# Patient Record
Sex: MALE | Race: WHITE | NOT HISPANIC OR LATINO | URBAN - METROPOLITAN AREA
[De-identification: names, ages, dates, MRNs, and addresses within clinical notes are randomized per-mention and may not be internally consistent; named-entity substitution may affect disease eponyms.]

---

## 2020-08-17 ENCOUNTER — INPATIENT (INPATIENT)
Facility: HOSPITAL | Age: 55
LOS: 0 days | Discharge: ANOTHER HOSPITAL | End: 2020-08-17
Attending: INTERNAL MEDICINE | Admitting: INTERNAL MEDICINE
Payer: COMMERCIAL

## 2020-08-17 ENCOUNTER — INPATIENT (INPATIENT)
Facility: HOSPITAL | Age: 55
LOS: 7 days | Discharge: HOME | DRG: 236 | End: 2020-08-25
Attending: THORACIC SURGERY (CARDIOTHORACIC VASCULAR SURGERY) | Admitting: THORACIC SURGERY (CARDIOTHORACIC VASCULAR SURGERY)
Payer: COMMERCIAL

## 2020-08-17 VITALS
SYSTOLIC BLOOD PRESSURE: 147 MMHG | HEART RATE: 91 BPM | DIASTOLIC BLOOD PRESSURE: 97 MMHG | OXYGEN SATURATION: 97 % | RESPIRATION RATE: 17 BRPM | TEMPERATURE: 98 F

## 2020-08-17 DIAGNOSIS — I25.118 ATHEROSCLEROTIC HEART DISEASE OF NATIVE CORONARY ARTERY WITH OTHER FORMS OF ANGINA PECTORIS: ICD-10-CM

## 2020-08-17 DIAGNOSIS — Z29.9 ENCOUNTER FOR PROPHYLACTIC MEASURES, UNSPECIFIED: ICD-10-CM

## 2020-08-17 DIAGNOSIS — E11.9 TYPE 2 DIABETES MELLITUS WITHOUT COMPLICATIONS: ICD-10-CM

## 2020-08-17 DIAGNOSIS — E78.5 HYPERLIPIDEMIA, UNSPECIFIED: ICD-10-CM

## 2020-08-17 DIAGNOSIS — I25.10 ATHEROSCLEROTIC HEART DISEASE OF NATIVE CORONARY ARTERY WITHOUT ANGINA PECTORIS: ICD-10-CM

## 2020-08-17 LAB
A1C WITH ESTIMATED AVERAGE GLUCOSE RESULT: 6.9 % — HIGH (ref 4–5.6)
ALBUMIN SERPL ELPH-MCNC: 4.2 G/DL — SIGNIFICANT CHANGE UP (ref 3.3–5.2)
ALP SERPL-CCNC: 61 U/L — SIGNIFICANT CHANGE UP (ref 40–120)
ALT FLD-CCNC: 36 U/L — SIGNIFICANT CHANGE UP
ANION GAP SERPL CALC-SCNC: 15 MMOL/L — SIGNIFICANT CHANGE UP (ref 5–17)
APTT BLD: 36.7 SEC — HIGH (ref 27.5–35.5)
AST SERPL-CCNC: 34 U/L — SIGNIFICANT CHANGE UP
BASOPHILS # BLD AUTO: 0.05 K/UL — SIGNIFICANT CHANGE UP (ref 0–0.2)
BASOPHILS NFR BLD AUTO: 0.5 % — SIGNIFICANT CHANGE UP (ref 0–2)
BILIRUB SERPL-MCNC: 0.4 MG/DL — SIGNIFICANT CHANGE UP (ref 0.4–2)
BLD GP AB SCN SERPL QL: SIGNIFICANT CHANGE UP
BUN SERPL-MCNC: 15 MG/DL — SIGNIFICANT CHANGE UP (ref 8–20)
CALCIUM SERPL-MCNC: 10 MG/DL — SIGNIFICANT CHANGE UP (ref 8.6–10.2)
CHLORIDE SERPL-SCNC: 104 MMOL/L — SIGNIFICANT CHANGE UP (ref 98–107)
CO2 SERPL-SCNC: 22 MMOL/L — SIGNIFICANT CHANGE UP (ref 22–29)
CREAT SERPL-MCNC: 1.05 MG/DL — SIGNIFICANT CHANGE UP (ref 0.5–1.3)
EOSINOPHIL # BLD AUTO: 0.36 K/UL — SIGNIFICANT CHANGE UP (ref 0–0.5)
EOSINOPHIL NFR BLD AUTO: 3.8 % — SIGNIFICANT CHANGE UP (ref 0–6)
ESTIMATED AVERAGE GLUCOSE: 151 MG/DL — HIGH (ref 68–114)
GLUCOSE BLDC GLUCOMTR-MCNC: 158 MG/DL — HIGH (ref 70–99)
GLUCOSE SERPL-MCNC: 164 MG/DL — HIGH (ref 70–99)
HCT VFR BLD CALC: 39.1 % — SIGNIFICANT CHANGE UP (ref 39–50)
HGB BLD-MCNC: 13.4 G/DL — SIGNIFICANT CHANGE UP (ref 13–17)
IMM GRANULOCYTES NFR BLD AUTO: 0.2 % — SIGNIFICANT CHANGE UP (ref 0–1.5)
INR BLD: 1.21 RATIO — HIGH (ref 0.88–1.16)
LYMPHOCYTES # BLD AUTO: 2.17 K/UL — SIGNIFICANT CHANGE UP (ref 1–3.3)
LYMPHOCYTES # BLD AUTO: 22.6 % — SIGNIFICANT CHANGE UP (ref 13–44)
MAGNESIUM SERPL-MCNC: 2 MG/DL — SIGNIFICANT CHANGE UP (ref 1.6–2.6)
MCHC RBC-ENTMCNC: 28.2 PG — SIGNIFICANT CHANGE UP (ref 27–34)
MCHC RBC-ENTMCNC: 34.3 GM/DL — SIGNIFICANT CHANGE UP (ref 32–36)
MCV RBC AUTO: 82.1 FL — SIGNIFICANT CHANGE UP (ref 80–100)
MONOCYTES # BLD AUTO: 0.63 K/UL — SIGNIFICANT CHANGE UP (ref 0–0.9)
MONOCYTES NFR BLD AUTO: 6.6 % — SIGNIFICANT CHANGE UP (ref 2–14)
NEUTROPHILS # BLD AUTO: 6.37 K/UL — SIGNIFICANT CHANGE UP (ref 1.8–7.4)
NEUTROPHILS NFR BLD AUTO: 66.3 % — SIGNIFICANT CHANGE UP (ref 43–77)
NT-PROBNP SERPL-SCNC: 569 PG/ML — HIGH (ref 0–300)
PLATELET # BLD AUTO: 274 K/UL — SIGNIFICANT CHANGE UP (ref 150–400)
POTASSIUM SERPL-MCNC: 3.7 MMOL/L — SIGNIFICANT CHANGE UP (ref 3.5–5.3)
POTASSIUM SERPL-SCNC: 3.7 MMOL/L — SIGNIFICANT CHANGE UP (ref 3.5–5.3)
PREALB SERPL-MCNC: 21 MG/DL — SIGNIFICANT CHANGE UP (ref 18–38)
PROT SERPL-MCNC: 6.7 G/DL — SIGNIFICANT CHANGE UP (ref 6.6–8.7)
PROTHROM AB SERPL-ACNC: 13.9 SEC — HIGH (ref 10.6–13.6)
RBC # BLD: 4.76 M/UL — SIGNIFICANT CHANGE UP (ref 4.2–5.8)
RBC # FLD: 13.2 % — SIGNIFICANT CHANGE UP (ref 10.3–14.5)
SODIUM SERPL-SCNC: 141 MMOL/L — SIGNIFICANT CHANGE UP (ref 135–145)
TSH SERPL-MCNC: 4.58 UIU/ML — HIGH (ref 0.27–4.2)
WBC # BLD: 9.6 K/UL — SIGNIFICANT CHANGE UP (ref 3.8–10.5)
WBC # FLD AUTO: 9.6 K/UL — SIGNIFICANT CHANGE UP (ref 3.8–10.5)

## 2020-08-17 PROCEDURE — 93458 L HRT ARTERY/VENTRICLE ANGIO: CPT | Mod: 26

## 2020-08-17 PROCEDURE — 93010 ELECTROCARDIOGRAM REPORT: CPT

## 2020-08-17 PROCEDURE — 99285 EMERGENCY DEPT VISIT HI MDM: CPT

## 2020-08-17 PROCEDURE — 71045 X-RAY EXAM CHEST 1 VIEW: CPT | Mod: 26

## 2020-08-17 PROCEDURE — 99223 1ST HOSP IP/OBS HIGH 75: CPT | Mod: 25

## 2020-08-17 PROCEDURE — 71045 X-RAY EXAM CHEST 1 VIEW: CPT | Mod: 26,77

## 2020-08-17 PROCEDURE — 93306 TTE W/DOPPLER COMPLETE: CPT | Mod: 26,59

## 2020-08-17 PROCEDURE — 99222 1ST HOSP IP/OBS MODERATE 55: CPT

## 2020-08-17 RX ORDER — GLUCAGON INJECTION, SOLUTION 0.5 MG/.1ML
1 INJECTION, SOLUTION SUBCUTANEOUS ONCE
Refills: 0 | Status: DISCONTINUED | OUTPATIENT
Start: 2020-08-17 | End: 2020-08-20

## 2020-08-17 RX ORDER — DEXTROSE 50 % IN WATER 50 %
25 SYRINGE (ML) INTRAVENOUS ONCE
Refills: 0 | Status: DISCONTINUED | OUTPATIENT
Start: 2020-08-17 | End: 2020-08-20

## 2020-08-17 RX ORDER — DEXTROSE 50 % IN WATER 50 %
12.5 SYRINGE (ML) INTRAVENOUS ONCE
Refills: 0 | Status: DISCONTINUED | OUTPATIENT
Start: 2020-08-17 | End: 2020-08-20

## 2020-08-17 RX ORDER — POTASSIUM CHLORIDE 20 MEQ
40 PACKET (EA) ORAL ONCE
Refills: 0 | Status: COMPLETED | OUTPATIENT
Start: 2020-08-17 | End: 2020-08-17

## 2020-08-17 RX ORDER — METOPROLOL TARTRATE 50 MG
25 TABLET ORAL
Refills: 0 | Status: DISCONTINUED | OUTPATIENT
Start: 2020-08-17 | End: 2020-08-20

## 2020-08-17 RX ORDER — CHLORHEXIDINE GLUCONATE 213 G/1000ML
15 SOLUTION TOPICAL
Refills: 0 | Status: DISCONTINUED | OUTPATIENT
Start: 2020-08-17 | End: 2020-08-20

## 2020-08-17 RX ORDER — SODIUM CHLORIDE 9 MG/ML
1000 INJECTION, SOLUTION INTRAVENOUS
Refills: 0 | Status: DISCONTINUED | OUTPATIENT
Start: 2020-08-17 | End: 2020-08-20

## 2020-08-17 RX ORDER — ATORVASTATIN CALCIUM 80 MG/1
40 TABLET, FILM COATED ORAL AT BEDTIME
Refills: 0 | Status: DISCONTINUED | OUTPATIENT
Start: 2020-08-17 | End: 2020-08-20

## 2020-08-17 RX ORDER — CHLORHEXIDINE GLUCONATE 213 G/1000ML
1 SOLUTION TOPICAL
Refills: 0 | Status: DISCONTINUED | OUTPATIENT
Start: 2020-08-17 | End: 2020-08-20

## 2020-08-17 RX ORDER — ASPIRIN/CALCIUM CARB/MAGNESIUM 324 MG
81 TABLET ORAL DAILY
Refills: 0 | Status: DISCONTINUED | OUTPATIENT
Start: 2020-08-17 | End: 2020-08-18

## 2020-08-17 RX ORDER — METOPROLOL TARTRATE 50 MG
25 TABLET ORAL
Refills: 0 | Status: DISCONTINUED | OUTPATIENT
Start: 2020-08-17 | End: 2020-08-17

## 2020-08-17 RX ORDER — SODIUM CHLORIDE 9 MG/ML
3 INJECTION INTRAMUSCULAR; INTRAVENOUS; SUBCUTANEOUS EVERY 8 HOURS
Refills: 0 | Status: DISCONTINUED | OUTPATIENT
Start: 2020-08-17 | End: 2020-08-20

## 2020-08-17 RX ORDER — DEXTROSE 50 % IN WATER 50 %
15 SYRINGE (ML) INTRAVENOUS ONCE
Refills: 0 | Status: DISCONTINUED | OUTPATIENT
Start: 2020-08-17 | End: 2020-08-20

## 2020-08-17 RX ORDER — INSULIN LISPRO 100/ML
VIAL (ML) SUBCUTANEOUS
Refills: 0 | Status: DISCONTINUED | OUTPATIENT
Start: 2020-08-17 | End: 2020-08-20

## 2020-08-17 RX ADMIN — Medication 40 MILLIEQUIVALENT(S): at 22:01

## 2020-08-17 RX ADMIN — ATORVASTATIN CALCIUM 40 MILLIGRAM(S): 80 TABLET, FILM COATED ORAL at 21:14

## 2020-08-17 RX ADMIN — SODIUM CHLORIDE 3 MILLILITER(S): 9 INJECTION INTRAMUSCULAR; INTRAVENOUS; SUBCUTANEOUS at 21:14

## 2020-08-17 RX ADMIN — Medication 25 MILLIGRAM(S): at 22:01

## 2020-08-17 NOTE — H&P ADULT - PROBLEM SELECTOR PLAN 2
Consist. carb diet  Endo consult in AM  MISHEL  Will consider pre meal and Lantus in AM  Diabetic education

## 2020-08-17 NOTE — H&P ADULT - PROBLEM SELECTOR PLAN 4
SCD for DVT prophylaxis  Protonix for PUD prophylaxis  Further plan needs to discuss with CTS team in AM rounds

## 2020-08-17 NOTE — H&P ADULT - NSHPSOCIALHISTORY_GEN_ALL_CORE
Marital status:   Lives with: Wife  Occupation:    Tobacco use: Never smoker  Alcohol use: Denies  Illicit drug use: Denies    Code status: Full code

## 2020-08-17 NOTE — H&P ADULT - NSHPPHYSICALEXAM_GEN_ALL_CORE
Neuro: A+O x 3, non-focal, speech clear and intact  HEENT: PERRL, EOMI, oral mucosa pink and moist  Neck: supple, no JVD  CV: regular rate, regular rhythm, +S1S2, no murmurs or rub  Pulm/chest: lung sounds CTA and equal bilaterally, no accessory muscle use noted  Abd: soft, NT, ND, +BS  Ext: MUNOZ x 4, no C/C/E  Skin: warm, well perfused, no rashes

## 2020-08-17 NOTE — H&P ADULT - ASSESSMENT
54 year old Male with PMH of Type II DM and [place] [facility] [place] which revealed Triple vessel diease. 54 year old Male with PMH of Type II DM and HLD transferred from [facility] [place] after Cardiac cath which revealed Triple vessel diease. Prox LAD 99%, Mid LAD 90%, Dist. LAD 85%. Mid Cx 85%, OM1 85%, Dist RCA 90% stenosis.

## 2020-08-17 NOTE — H&P ADULT - HISTORY OF PRESENT ILLNESS
54 year old Male with PMH of DM type II, HLD transferred from [facility] s/p cardiac cath which reveals Triple vessel disease. Patient presented to [facility] [place] with sudden onset of chest pressure and tightness radiating to neck and left arm, onset during rest, improved after Aspirin 81 mg x4 by EMS. As per patient he noticed to have mild to moderate  activity intolerance over 1 year, worsened in past week to a level that he has to stop between minimal activities of daily living. Patient denies fever, chills, shortness of breath, dizziness, headache, abdominal pain, N/V/D. Patient denies lower extremity edema/pain, recent long distance travel or sick contact. Patient denies exposure to COVID 19 but his wife recently positive for COVID antibodies. Patient COVID PCR from [facility] tested negative on 8/17/2020.

## 2020-08-17 NOTE — H&P ADULT - PROBLEM SELECTOR PLAN 1
Admit under Dr. Plasencia  Pre op work up started  ECHO, Carotid, PFT Pending  COVID antibody/PCR pending (PCR negative from [facility] 8/17)  Lopressor 25 mg BID  Lipitor 40 mg   Aspirin 81 mg   DASH/ Const. Carb diet  Insulin coverage as per sliding scale, (AIC 6.9), will consider Lantus, pre- meal in AM  Endo consult in AM Admit under Dr. Plasencia  Pre op work up started  ECHO, Carotid, PFT Pending  COVID antibody/PCR pending (PCR negative from [facility] 8/17)  Lopressor 25 mg BID  Lipitor 40 mg   DASH/ Const. Carb diet  Insulin coverage as per sliding scale, (AIC 6.9), will consider Lantus, pre- meal in AM  Endo consult in AM

## 2020-08-17 NOTE — H&P ADULT - NSHPLABSRESULTS_GEN_ALL_CORE
Complete Blood Count + Automated Diff (08.17.20 @ 20:16)    WBC Count: 9.60 K/uL    RBC Count: 4.76 M/uL    Hemoglobin: 13.4 g/dL    Hematocrit: 39.1 %    Mean Cell Volume: 82.1 fl    Mean Cell Hemoglobin: 28.2 pg    Mean Cell Hemoglobin Conc: 34.3 gm/dL    Red Cell Distrib Width: 13.2 %    Platelet Count - Automated: 274 K/uL    Auto Neutrophil #: 6.37 K/uL    Auto Lymphocyte #: 2.17 K/uL    Auto Monocyte #: 0.63 K/uL    Auto Eosinophil #: 0.36 K/uL    Auto Basophil #: 0.05 K/uL    Auto Neutrophil %: 66.3: Differential percentages must be correlated with absolute numbers for  clinical significance. %    Auto Lymphocyte %: 22.6 %    Auto Monocyte %: 6.6 %    Auto Eosinophil %: 3.8 %    Auto Basophil %: 0.5 %    Auto Immature Granulocyte %: 0.2 %    Comprehensive Metabolic Panel (08.17.20 @ 20:16)    Sodium, Serum: 141 mmol/L    Potassium, Serum: 3.7 mmol/L    Chloride, Serum: 104 mmol/L    Carbon Dioxide, Serum: 22.0 mmol/L    Anion Gap, Serum: 15 mmol/L    Blood Urea Nitrogen, Serum: 15.0 mg/dL    Creatinine, Serum: 1.05 mg/dL    Glucose, Serum: 164 mg/dL    Calcium, Total Serum: 10.0 mg/dL    Protein Total, Serum: 6.7 g/dL    Albumin, Serum: 4.2 g/dL    Bilirubin Total, Serum: 0.4 mg/dL    Alkaline Phosphatase, Serum: 61 U/L    Aspartate Aminotransferase (AST/SGOT): 34 U/L    Alanine Aminotransferase (ALT/SGPT): 36 U/L    eGFR if Non : 81: Interpretative comment    PT/INR - ( 17 Aug 2020 20:16 )   PT: 13.9 sec;   INR: 1.21 ratio         PTT - ( 17 Aug 2020 20:16 )  PTT:36.7 sec

## 2020-08-18 LAB
ABO RH CONFIRMATION: SIGNIFICANT CHANGE UP
ALBUMIN SERPL ELPH-MCNC: 4.1 G/DL — SIGNIFICANT CHANGE UP (ref 3.3–5.2)
ALP SERPL-CCNC: 61 U/L — SIGNIFICANT CHANGE UP (ref 40–120)
ALT FLD-CCNC: 35 U/L — SIGNIFICANT CHANGE UP
ANION GAP SERPL CALC-SCNC: 14 MMOL/L — SIGNIFICANT CHANGE UP (ref 5–17)
APPEARANCE UR: CLEAR — SIGNIFICANT CHANGE UP
AST SERPL-CCNC: 29 U/L — SIGNIFICANT CHANGE UP
BASOPHILS # BLD AUTO: 0.04 K/UL — SIGNIFICANT CHANGE UP (ref 0–0.2)
BASOPHILS NFR BLD AUTO: 0.5 % — SIGNIFICANT CHANGE UP (ref 0–2)
BILIRUB SERPL-MCNC: 0.4 MG/DL — SIGNIFICANT CHANGE UP (ref 0.4–2)
BILIRUB UR-MCNC: NEGATIVE — SIGNIFICANT CHANGE UP
BUN SERPL-MCNC: 13 MG/DL — SIGNIFICANT CHANGE UP (ref 8–20)
CALCIUM SERPL-MCNC: 9.7 MG/DL — SIGNIFICANT CHANGE UP (ref 8.6–10.2)
CHLORIDE SERPL-SCNC: 103 MMOL/L — SIGNIFICANT CHANGE UP (ref 98–107)
CHOLEST SERPL-MCNC: 171 MG/DL — SIGNIFICANT CHANGE UP (ref 110–199)
CO2 SERPL-SCNC: 25 MMOL/L — SIGNIFICANT CHANGE UP (ref 22–29)
COLOR SPEC: YELLOW — SIGNIFICANT CHANGE UP
CREAT SERPL-MCNC: 1.07 MG/DL — SIGNIFICANT CHANGE UP (ref 0.5–1.3)
DIFF PNL FLD: NEGATIVE — SIGNIFICANT CHANGE UP
EOSINOPHIL # BLD AUTO: 0.33 K/UL — SIGNIFICANT CHANGE UP (ref 0–0.5)
EOSINOPHIL NFR BLD AUTO: 4 % — SIGNIFICANT CHANGE UP (ref 0–6)
EPI CELLS # UR: SIGNIFICANT CHANGE UP
GLUCOSE BLDC GLUCOMTR-MCNC: 130 MG/DL — HIGH (ref 70–99)
GLUCOSE BLDC GLUCOMTR-MCNC: 143 MG/DL — HIGH (ref 70–99)
GLUCOSE BLDC GLUCOMTR-MCNC: 148 MG/DL — HIGH (ref 70–99)
GLUCOSE BLDC GLUCOMTR-MCNC: 155 MG/DL — HIGH (ref 70–99)
GLUCOSE SERPL-MCNC: 140 MG/DL — HIGH (ref 70–99)
GLUCOSE UR QL: NEGATIVE MG/DL — SIGNIFICANT CHANGE UP
HCT VFR BLD CALC: 39.3 % — SIGNIFICANT CHANGE UP (ref 39–50)
HDLC SERPL-MCNC: 32 MG/DL — LOW
HGB BLD-MCNC: 13.2 G/DL — SIGNIFICANT CHANGE UP (ref 13–17)
IMM GRANULOCYTES NFR BLD AUTO: 0.4 % — SIGNIFICANT CHANGE UP (ref 0–1.5)
KETONES UR-MCNC: NEGATIVE — SIGNIFICANT CHANGE UP
LEUKOCYTE ESTERASE UR-ACNC: ABNORMAL
LIPID PNL WITH DIRECT LDL SERPL: 108 MG/DL — SIGNIFICANT CHANGE UP
LYMPHOCYTES # BLD AUTO: 1.81 K/UL — SIGNIFICANT CHANGE UP (ref 1–3.3)
LYMPHOCYTES # BLD AUTO: 22.1 % — SIGNIFICANT CHANGE UP (ref 13–44)
MAGNESIUM SERPL-MCNC: 2 MG/DL — SIGNIFICANT CHANGE UP (ref 1.6–2.6)
MCHC RBC-ENTMCNC: 28.2 PG — SIGNIFICANT CHANGE UP (ref 27–34)
MCHC RBC-ENTMCNC: 33.6 GM/DL — SIGNIFICANT CHANGE UP (ref 32–36)
MCV RBC AUTO: 84 FL — SIGNIFICANT CHANGE UP (ref 80–100)
MONOCYTES # BLD AUTO: 0.61 K/UL — SIGNIFICANT CHANGE UP (ref 0–0.9)
MONOCYTES NFR BLD AUTO: 7.4 % — SIGNIFICANT CHANGE UP (ref 2–14)
MRSA PCR RESULT.: SIGNIFICANT CHANGE UP
NEUTROPHILS # BLD AUTO: 5.38 K/UL — SIGNIFICANT CHANGE UP (ref 1.8–7.4)
NEUTROPHILS NFR BLD AUTO: 65.6 % — SIGNIFICANT CHANGE UP (ref 43–77)
NITRITE UR-MCNC: NEGATIVE — SIGNIFICANT CHANGE UP
PH UR: 5 — SIGNIFICANT CHANGE UP (ref 5–8)
PLATELET # BLD AUTO: 287 K/UL — SIGNIFICANT CHANGE UP (ref 150–400)
POTASSIUM SERPL-MCNC: 3.9 MMOL/L — SIGNIFICANT CHANGE UP (ref 3.5–5.3)
POTASSIUM SERPL-SCNC: 3.9 MMOL/L — SIGNIFICANT CHANGE UP (ref 3.5–5.3)
PROT SERPL-MCNC: 6.9 G/DL — SIGNIFICANT CHANGE UP (ref 6.6–8.7)
PROT UR-MCNC: 15 MG/DL
RBC # BLD: 4.68 M/UL — SIGNIFICANT CHANGE UP (ref 4.2–5.8)
RBC # FLD: 13.4 % — SIGNIFICANT CHANGE UP (ref 10.3–14.5)
RBC CASTS # UR COMP ASSIST: NEGATIVE /HPF — SIGNIFICANT CHANGE UP (ref 0–4)
S AUREUS DNA NOSE QL NAA+PROBE: SIGNIFICANT CHANGE UP
SARS-COV-2 IGG SERPL QL IA: NEGATIVE — SIGNIFICANT CHANGE UP
SARS-COV-2 IGM SERPL IA-ACNC: 0.01 INDEX — SIGNIFICANT CHANGE UP
SARS-COV-2 RNA SPEC QL NAA+PROBE: SIGNIFICANT CHANGE UP
SODIUM SERPL-SCNC: 142 MMOL/L — SIGNIFICANT CHANGE UP (ref 135–145)
SP GR SPEC: 1.02 — SIGNIFICANT CHANGE UP (ref 1.01–1.02)
TOTAL CHOLESTEROL/HDL RATIO MEASUREMENT: 5 RATIO — SIGNIFICANT CHANGE UP (ref 3.4–9.6)
TRIGL SERPL-MCNC: 154 MG/DL — SIGNIFICANT CHANGE UP (ref 10–200)
UROBILINOGEN FLD QL: NEGATIVE MG/DL — SIGNIFICANT CHANGE UP
WBC # BLD: 8.2 K/UL — SIGNIFICANT CHANGE UP (ref 3.8–10.5)
WBC # FLD AUTO: 8.2 K/UL — SIGNIFICANT CHANGE UP (ref 3.8–10.5)
WBC UR QL: SIGNIFICANT CHANGE UP

## 2020-08-18 PROCEDURE — 93880 EXTRACRANIAL BILAT STUDY: CPT | Mod: 26

## 2020-08-18 PROCEDURE — 93306 TTE W/DOPPLER COMPLETE: CPT | Mod: 26

## 2020-08-18 RX ORDER — PANTOPRAZOLE SODIUM 20 MG/1
40 TABLET, DELAYED RELEASE ORAL
Refills: 0 | Status: DISCONTINUED | OUTPATIENT
Start: 2020-08-18 | End: 2020-08-20

## 2020-08-18 RX ADMIN — CHLORHEXIDINE GLUCONATE 1 APPLICATION(S): 213 SOLUTION TOPICAL at 10:43

## 2020-08-18 RX ADMIN — SODIUM CHLORIDE 3 MILLILITER(S): 9 INJECTION INTRAMUSCULAR; INTRAVENOUS; SUBCUTANEOUS at 05:03

## 2020-08-18 RX ADMIN — SODIUM CHLORIDE 3 MILLILITER(S): 9 INJECTION INTRAMUSCULAR; INTRAVENOUS; SUBCUTANEOUS at 21:20

## 2020-08-18 RX ADMIN — CHLORHEXIDINE GLUCONATE 15 MILLILITER(S): 213 SOLUTION TOPICAL at 05:07

## 2020-08-18 RX ADMIN — CHLORHEXIDINE GLUCONATE 1 APPLICATION(S): 213 SOLUTION TOPICAL at 19:54

## 2020-08-18 RX ADMIN — Medication 25 MILLIGRAM(S): at 17:19

## 2020-08-18 RX ADMIN — SODIUM CHLORIDE 3 MILLILITER(S): 9 INJECTION INTRAMUSCULAR; INTRAVENOUS; SUBCUTANEOUS at 13:13

## 2020-08-18 RX ADMIN — Medication 25 MILLIGRAM(S): at 05:07

## 2020-08-18 RX ADMIN — ATORVASTATIN CALCIUM 40 MILLIGRAM(S): 80 TABLET, FILM COATED ORAL at 21:07

## 2020-08-18 RX ADMIN — PANTOPRAZOLE SODIUM 40 MILLIGRAM(S): 20 TABLET, DELAYED RELEASE ORAL at 05:07

## 2020-08-18 RX ADMIN — CHLORHEXIDINE GLUCONATE 15 MILLILITER(S): 213 SOLUTION TOPICAL at 17:19

## 2020-08-18 NOTE — CHART NOTE - NSCHARTNOTEFT_GEN_A_CORE
CTS ACP Addendum    Briefly,     Patient seen and examined. Notes, flowsheets, medications, radiologic images and labs reviewed.     Plan:  -     Case discussed with Dr. 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 Prox LAD 99%, Mid LAD 90%, Dist. LAD 85%. Mid Cx 85%, OM1 85%, Dist RCA 90% stenosis.    Patient seen and examined. Notes, flowsheets, medications, radiologic images and labs reviewed.         Plan:  - OR Thursday for CABG with Dr Plasencia  - Preop workup in progress  - Carotids normal  - Glycemic control  - Vein mapping done    Case discussed with Dr. Plasencia

## 2020-08-19 LAB
ANION GAP SERPL CALC-SCNC: 14 MMOL/L — SIGNIFICANT CHANGE UP (ref 5–17)
BLD GP AB SCN SERPL QL: SIGNIFICANT CHANGE UP
BUN SERPL-MCNC: 17 MG/DL — SIGNIFICANT CHANGE UP (ref 8–20)
CALCIUM SERPL-MCNC: 9.7 MG/DL — SIGNIFICANT CHANGE UP (ref 8.6–10.2)
CHLORIDE SERPL-SCNC: 100 MMOL/L — SIGNIFICANT CHANGE UP (ref 98–107)
CO2 SERPL-SCNC: 25 MMOL/L — SIGNIFICANT CHANGE UP (ref 22–29)
CREAT SERPL-MCNC: 1.19 MG/DL — SIGNIFICANT CHANGE UP (ref 0.5–1.3)
GLUCOSE BLDC GLUCOMTR-MCNC: 134 MG/DL — HIGH (ref 70–99)
GLUCOSE BLDC GLUCOMTR-MCNC: 147 MG/DL — HIGH (ref 70–99)
GLUCOSE BLDC GLUCOMTR-MCNC: 166 MG/DL — HIGH (ref 70–99)
GLUCOSE SERPL-MCNC: 136 MG/DL — HIGH (ref 70–99)
HCT VFR BLD CALC: 38.8 % — LOW (ref 39–50)
HGB BLD-MCNC: 13 G/DL — SIGNIFICANT CHANGE UP (ref 13–17)
MCHC RBC-ENTMCNC: 28 PG — SIGNIFICANT CHANGE UP (ref 27–34)
MCHC RBC-ENTMCNC: 33.5 GM/DL — SIGNIFICANT CHANGE UP (ref 32–36)
MCV RBC AUTO: 83.4 FL — SIGNIFICANT CHANGE UP (ref 80–100)
PA ADP PRP-ACNC: 235 PRU — SIGNIFICANT CHANGE UP (ref 180–376)
PLATELET # BLD AUTO: 267 K/UL — SIGNIFICANT CHANGE UP (ref 150–400)
POTASSIUM SERPL-MCNC: 3.6 MMOL/L — SIGNIFICANT CHANGE UP (ref 3.5–5.3)
POTASSIUM SERPL-SCNC: 3.6 MMOL/L — SIGNIFICANT CHANGE UP (ref 3.5–5.3)
RBC # BLD: 4.65 M/UL — SIGNIFICANT CHANGE UP (ref 4.2–5.8)
RBC # FLD: 13.3 % — SIGNIFICANT CHANGE UP (ref 10.3–14.5)
SODIUM SERPL-SCNC: 139 MMOL/L — SIGNIFICANT CHANGE UP (ref 135–145)
WBC # BLD: 9.51 K/UL — SIGNIFICANT CHANGE UP (ref 3.8–10.5)
WBC # FLD AUTO: 9.51 K/UL — SIGNIFICANT CHANGE UP (ref 3.8–10.5)

## 2020-08-19 RX ORDER — CEFUROXIME AXETIL 250 MG
1500 TABLET ORAL ONCE
Refills: 0 | Status: DISCONTINUED | OUTPATIENT
Start: 2020-08-20 | End: 2020-08-20

## 2020-08-19 RX ORDER — VANCOMYCIN HCL 1 G
1000 VIAL (EA) INTRAVENOUS ONCE
Refills: 0 | Status: DISCONTINUED | OUTPATIENT
Start: 2020-08-20 | End: 2020-08-21

## 2020-08-19 RX ORDER — POTASSIUM CHLORIDE 20 MEQ
40 PACKET (EA) ORAL ONCE
Refills: 0 | Status: COMPLETED | OUTPATIENT
Start: 2020-08-19 | End: 2020-08-19

## 2020-08-19 RX ADMIN — SODIUM CHLORIDE 3 MILLILITER(S): 9 INJECTION INTRAMUSCULAR; INTRAVENOUS; SUBCUTANEOUS at 21:06

## 2020-08-19 RX ADMIN — CHLORHEXIDINE GLUCONATE 15 MILLILITER(S): 213 SOLUTION TOPICAL at 17:18

## 2020-08-19 RX ADMIN — Medication 25 MILLIGRAM(S): at 17:18

## 2020-08-19 RX ADMIN — PANTOPRAZOLE SODIUM 40 MILLIGRAM(S): 20 TABLET, DELAYED RELEASE ORAL at 05:39

## 2020-08-19 RX ADMIN — ATORVASTATIN CALCIUM 40 MILLIGRAM(S): 80 TABLET, FILM COATED ORAL at 21:02

## 2020-08-19 RX ADMIN — SODIUM CHLORIDE 3 MILLILITER(S): 9 INJECTION INTRAMUSCULAR; INTRAVENOUS; SUBCUTANEOUS at 05:37

## 2020-08-19 RX ADMIN — CHLORHEXIDINE GLUCONATE 15 MILLILITER(S): 213 SOLUTION TOPICAL at 05:39

## 2020-08-19 RX ADMIN — Medication 25 MILLIGRAM(S): at 05:39

## 2020-08-19 RX ADMIN — CHLORHEXIDINE GLUCONATE 1 APPLICATION(S): 213 SOLUTION TOPICAL at 05:45

## 2020-08-19 RX ADMIN — CHLORHEXIDINE GLUCONATE 1 APPLICATION(S): 213 SOLUTION TOPICAL at 17:09

## 2020-08-19 RX ADMIN — SODIUM CHLORIDE 3 MILLILITER(S): 9 INJECTION INTRAMUSCULAR; INTRAVENOUS; SUBCUTANEOUS at 15:49

## 2020-08-19 RX ADMIN — Medication 40 MILLIEQUIVALENT(S): at 11:42

## 2020-08-19 NOTE — PROGRESS NOTE ADULT - SUBJECTIVE AND OBJECTIVE BOX
Cardiac Surgery Pre-op Note:                                                                                                                  Surgeon: Erinn    Procedure: CABG    Allergies    No Known Allergies    Intolerances        HPI:  54 year old Male with PMH of DM type II, HLD transferred from [facility] s/p cardiac cath which reveals Triple vessel disease. Patient presented to [facility] [place] with sudden onset of chest pressure and tightness radiating to neck and left arm, onset during rest, improved after Aspirin 81 mg x4 by EMS. As per patient he noticed to have mild to moderate  activity intolerance over 1 year, worsened in past week to a level that he has to stop between minimal activities of daily living. Patient denies fever, chills, shortness of breath, dizziness, headache, abdominal pain, N/V/D. Patient denies lower extremity edema/pain, recent long distance travel or sick contact. Patient denies exposure to COVID 19 but his wife recently positive for COVID antibodies. Patient COVID PCR from [facility] [place] tested negative on 2020. (17 Aug 2020 21:14).  Plan for CABG.      PAST MEDICAL & SURGICAL HISTORY:  Hyperlipidemia  Diabetes mellitus, type 2      MEDICATIONS  (STANDING):  atorvastatin 40 milliGRAM(s) Oral at bedtime  chlorhexidine 0.12% Liquid 15 milliLiter(s) Oral Mucosa two times a day  chlorhexidine 4% Liquid 1 Application(s) Topical two times a day  dextrose 5%. 1000 milliLiter(s) (50 mL/Hr) IV Continuous <Continuous>  dextrose 50% Injectable 12.5 Gram(s) IV Push once  dextrose 50% Injectable 25 Gram(s) IV Push once  dextrose 50% Injectable 25 Gram(s) IV Push once  insulin lispro (HumaLOG) corrective regimen sliding scale   SubCutaneous four times a day before meals  metoprolol tartrate 25 milliGRAM(s) Oral two times a day  pantoprazole    Tablet 40 milliGRAM(s) Oral before breakfast  sodium chloride 0.9% lock flush 3 milliLiter(s) IV Push every 8 hours    MEDICATIONS  (PRN):  dextrose 40% Gel 15 Gram(s) Oral once PRN Blood Glucose LESS THAN 70 milliGRAM(s)/deciliter  glucagon  Injectable 1 milliGRAM(s) IntraMuscular once PRN Glucose LESS THAN 70 milligrams/deciliter        Labs:                        13.0   9.51  )-----------( 267      ( 19 Aug 2020 06:28 )             38.8     08-    139  |  100  |  17.0  ----------------------------<  136<H>  3.6   |  25.0  |  1.19    Ca    9.7      19 Aug 2020 06:28  Mg     2.0         TPro  6.9  /  Alb  4.1  /  TBili  0.4  /  DBili  x   /  AST  29  /  ALT  35  /  AlkPhos  61      PT/INR - ( 17 Aug 2020 20:16 )   PT: 13.9 sec;   INR: 1.21 ratio         PTT - ( 17 Aug 2020 20:16 )  PTT:36.7 sec  Urinalysis Basic - ( 18 Aug 2020 23:27 )    Color: Yellow / Appearance: Clear / S.020 / pH: x  Gluc: x / Ketone: Negative  / Bili: Negative / Urobili: Negative mg/dL   Blood: x / Protein: 15 mg/dL / Nitrite: Negative   Leuk Esterase: Trace / RBC: Negative /HPF / WBC 0-2   Sq Epi: x / Non Sq Epi: Occasional / Bacteria: x    Serum Pro-Brain Natriuretic Peptide: 569 pg/mL (20 @ 20:16)      MRSA PCR Result.: NotDetec (20 @ 22:45)    ABO RH Interpretation: O POS (20 @ 06:38)              CXR:    EKG:    Carotid Duplex:    PFT's:    Echo:    Cath report:          Pt has AICD/PPm [ ] Yes  [ ] No             Brand Name:  Pre-op Beta Blocker ordered within 24 hrs of surgery?  [ ] Yes  [ ] No  If not, Why?  Type & Cross  [ ] Yes  [ ] No  NPO after Midnight [ ] Yes  [ ] No  Pre-op ABX ordered, to be taped on chart:  [ ] Yes  [ ] No   ( Vanco only if Anaphylaxis, or MRSA)  Consent obtained  [ ] Yes  [ ] No Cardiac Surgery Pre-op Note:                                                                                                                  Surgeon: Erinn    Procedure: CABG    Allergies    No Known Allergies    Intolerances        HPI:  54 year old Male with PMH of DM type II, HLD transferred from [facility] s/p cardiac cath which reveals Triple vessel disease. Patient presented to [facility] [place] with sudden onset of chest pressure and tightness radiating to neck and left arm, onset during rest, improved after Aspirin 81 mg x4 by EMS. As per patient he noticed to have mild to moderate  activity intolerance over 1 year, worsened in past week to a level that he has to stop between minimal activities of daily living. Patient denies fever, chills, shortness of breath, dizziness, headache, abdominal pain, N/V/D. Patient denies lower extremity edema/pain, recent long distance travel or sick contact. Patient denies exposure to COVID 19 but his wife recently positive for COVID antibodies. Patient COVID PCR from [facility] [place] tested negative on 2020. (17 Aug 2020 21:14).  Plan for CABG.      PAST MEDICAL & SURGICAL HISTORY:  Hyperlipidemia  Diabetes mellitus, type 2      MEDICATIONS  (STANDING):  atorvastatin 40 milliGRAM(s) Oral at bedtime  chlorhexidine 0.12% Liquid 15 milliLiter(s) Oral Mucosa two times a day  chlorhexidine 4% Liquid 1 Application(s) Topical two times a day  dextrose 5%. 1000 milliLiter(s) (50 mL/Hr) IV Continuous <Continuous>  dextrose 50% Injectable 12.5 Gram(s) IV Push once  dextrose 50% Injectable 25 Gram(s) IV Push once  dextrose 50% Injectable 25 Gram(s) IV Push once  insulin lispro (HumaLOG) corrective regimen sliding scale   SubCutaneous four times a day before meals  metoprolol tartrate 25 milliGRAM(s) Oral two times a day  pantoprazole    Tablet 40 milliGRAM(s) Oral before breakfast  sodium chloride 0.9% lock flush 3 milliLiter(s) IV Push every 8 hours    MEDICATIONS  (PRN):  dextrose 40% Gel 15 Gram(s) Oral once PRN Blood Glucose LESS THAN 70 milliGRAM(s)/deciliter  glucagon  Injectable 1 milliGRAM(s) IntraMuscular once PRN Glucose LESS THAN 70 milligrams/deciliter        Labs:                        13.0   9.51  )-----------( 267      ( 19 Aug 2020 06:28 )             38.8     08-    139  |  100  |  17.0  ----------------------------<  136<H>  3.6   |  25.0  |  1.19    Ca    9.7      19 Aug 2020 06:28  Mg     2.0         TPro  6.9  /  Alb  4.1  /  TBili  0.4  /  DBili  x   /  AST  29  /  ALT  35  /  AlkPhos  61      PT/INR - ( 17 Aug 2020 20:16 )   PT: 13.9 sec;   INR: 1.21 ratio         PTT - ( 17 Aug 2020 20:16 )  PTT:36.7 sec  Urinalysis Basic - ( 18 Aug 2020 23:27 )    Color: Yellow / Appearance: Clear / S.020 / pH: x  Gluc: x / Ketone: Negative  / Bili: Negative / Urobili: Negative mg/dL   Blood: x / Protein: 15 mg/dL / Nitrite: Negative   Leuk Esterase: Trace / RBC: Negative /HPF / WBC 0-2   Sq Epi: x / Non Sq Epi: Occasional / Bacteria: x    Serum Pro-Brain Natriuretic Peptide: 569 pg/mL (20 @ 20:16)      MRSA PCR Result.: NotDetec (20 @ 22:45)    ABO RH Interpretation: O POS (20 @ 06:38)    Thyroid Stimulating Hormone, Serum: 4.58 uIU/mL (20 @ 20:16)    P2Y12 Plt Reactivity: 235    Prealbumin, Serum: 21 mg/dL (20 @ 20:16)      CXR: < from: Xray Chest 1 View AP/PA. (20 @ 19:58) >   EXAM:  XR CHEST AP OR PA 1V                          PROCEDURE DATE:  2020          INTERPRETATION:  XR CHEST    History: Preoperative    Technique: Single AP view of the chest.    Comparison: None.    Findings:    Lungs/Pleura:  The lungs are clear.    Heart/Mediastinum:  The heart is normal in size.    Bones:  Degenerative changes in the spine.    Impression:    No acute pulmonary disease.    BRIAN ROSE M.D., ATTENDING RADIOLOGIST  This document has been electronicallysigned. Aug 18 2020  8:20AM       < end of copied text >      EKG: < from: 12 Lead ECG (08.17.20 @ 20:03) >  Ventricular Rate 75 BPM    Atrial Rate 75 BPM    P-R Interval 170 ms    QRS Duration 86 ms    Q-T Interval 364 ms    QTC Calculation(Bezet) 406 ms    P Axis 42 degrees    R Axis 11 degrees    T Axis 50 degrees    Diagnosis Line Normal sinus rhythm  Possible Left atrial enlargement  Nonspecific ST and T wave abnormality    Confirmed by ROBERTO MINER ([id number]) on 2020 2:31:34 PM    < end of copied text >      Carotid Duplex: < from: US Duplex Carotid Arteries Complete, Bilateral (20 @ 07:21) >   EXAM:  US DPLX CAROTIDS COMPL BI                          PROCEDURE DATE:  2020      INTERPRETATION:  CLINICAL INFORMATION: Preoperative.    COMPARISON: None available.    TECHNIQUE: Grayscale, color and spectral Doppler examination of both carotid arteries was performed.    FINDINGS:    Trace plaque in the carotid bulbs. Peak systolic velocities in the internal carotid arteries are within normal limits bilaterally.    Blood flow velocities are as follows:    RIGHT:  PROX CCA = 81 ;DIST CCA = 79 ;  PROX ICA = 64 ;  DIST ICA = 72 ;  ECA = 61    LEFT:  PROX CCA = 120 ;  DIST CCA = 75 ;  PROX ICA = 54 ;  DIST ICA = 69 ;  ECA = 71    Antegrade flow is noted within both vertebral arteries.    IMPRESSION: No significant plaque or evidence of a hemodynamically significant stenosis in either internal carotid artery.    Measurement of carotid stenosis is based on velocity parameters that correlate the residual internal carotid diameter with that of the more distal vessel in accordance with a method such as the [place] [employer] Symptomatic Carotid Endarterectomy Trial (NASCET).    BRIAN ROSE M.D., ATTENDING RADIOLOGIST    < end of copied text >      PFT's:    Echo: < from: TTE Echo Complete w/o Contrast w/ Doppler (20 @ 11:56) >  Summary:   1. Technically good study.   2. Endocardial visualization was enhanced with intravenous echo contrast.   3. Normal global left ventricular systolic function.   4. Left ventricular ejection fraction, by visual estimation, is 55 to 60%.   5. Multiple left ventricular regional wall motion abnormalities exist. See wall motion findings.   6. Spectral Doppler shows impaired relaxation pattern of left ventricular myocardial filling (Grade I diastolic dysfunction).   7. Trace mitral valve regurgitation.   8. There is no evidence of pericardial effusion.    MD Anil Electronically signed on 2020 at 7:54:20 PM    *** Final ***    BOB NARANJO   This document has been electronically signed. Aug 18 2020 11:56AM    < end of copied text >      Cath report: not available.  Performed at [facility] [place].    Physical Exam:  General: WN/WD NAD  Neurology: A&Ox3, nonfocal, MUNOZ x 4  Respiratory: CTA B/L  CV: RRR, S1S2.  Abdominal: Soft, NT, ND +BS.  Extremities: No edema, + peripheral pulses      Pt has AICD/PPm [ ] Yes  [x ] No             Brand Name:  Pre-op Beta Blocker ordered within 24 hrs of surgery?  [ x] Yes  [ ] No  If not, Why?  Type & Cross  [ x] Yes  [ ] No  NPO after Midnight [x ] Yes  [ ] No  Pre-op ABX ordered, to be taped on chart:  [x ] Yes  [ ] No   ( Vanco only if Anaphylaxis, or MRSA)  Consent obtained  [x ] Yes  [ ] No

## 2020-08-20 ENCOUNTER — APPOINTMENT (OUTPATIENT)
Dept: CARDIOTHORACIC SURGERY | Facility: HOSPITAL | Age: 55
End: 2020-08-20

## 2020-08-20 ENCOUNTER — RESULT REVIEW (OUTPATIENT)
Age: 55
End: 2020-08-20

## 2020-08-20 LAB
ALBUMIN SERPL ELPH-MCNC: 4 G/DL — SIGNIFICANT CHANGE UP (ref 3.3–5.2)
ALBUMIN SERPL ELPH-MCNC: 4.6 G/DL — SIGNIFICANT CHANGE UP (ref 3.3–5.2)
ALP SERPL-CCNC: 40 U/L — SIGNIFICANT CHANGE UP (ref 40–120)
ALP SERPL-CCNC: 69 U/L — SIGNIFICANT CHANGE UP (ref 40–120)
ALT FLD-CCNC: 21 U/L — SIGNIFICANT CHANGE UP
ALT FLD-CCNC: 36 U/L — SIGNIFICANT CHANGE UP
ANION GAP SERPL CALC-SCNC: 15 MMOL/L — SIGNIFICANT CHANGE UP (ref 5–17)
ANION GAP SERPL CALC-SCNC: 17 MMOL/L — SIGNIFICANT CHANGE UP (ref 5–17)
APTT BLD: 28.5 SEC — SIGNIFICANT CHANGE UP (ref 27.5–35.5)
AST SERPL-CCNC: 25 U/L — SIGNIFICANT CHANGE UP
AST SERPL-CCNC: 32 U/L — SIGNIFICANT CHANGE UP
BASOPHILS # BLD AUTO: 0.04 K/UL — SIGNIFICANT CHANGE UP (ref 0–0.2)
BASOPHILS NFR BLD AUTO: 0.3 % — SIGNIFICANT CHANGE UP (ref 0–2)
BILIRUB SERPL-MCNC: 0.6 MG/DL — SIGNIFICANT CHANGE UP (ref 0.4–2)
BILIRUB SERPL-MCNC: 0.6 MG/DL — SIGNIFICANT CHANGE UP (ref 0.4–2)
BUN SERPL-MCNC: 17 MG/DL — SIGNIFICANT CHANGE UP (ref 8–20)
BUN SERPL-MCNC: 20 MG/DL — SIGNIFICANT CHANGE UP (ref 8–20)
CALCIUM SERPL-MCNC: 10.1 MG/DL — SIGNIFICANT CHANGE UP (ref 8.6–10.2)
CALCIUM SERPL-MCNC: 9.2 MG/DL — SIGNIFICANT CHANGE UP (ref 8.6–10.2)
CHLORIDE SERPL-SCNC: 101 MMOL/L — SIGNIFICANT CHANGE UP (ref 98–107)
CHLORIDE SERPL-SCNC: 102 MMOL/L — SIGNIFICANT CHANGE UP (ref 98–107)
CK MB CFR SERPL CALC: 25.6 NG/ML — HIGH (ref 0–6.7)
CK SERPL-CCNC: 251 U/L — HIGH (ref 30–200)
CO2 SERPL-SCNC: 23 MMOL/L — SIGNIFICANT CHANGE UP (ref 22–29)
CO2 SERPL-SCNC: 23 MMOL/L — SIGNIFICANT CHANGE UP (ref 22–29)
CREAT SERPL-MCNC: 0.96 MG/DL — SIGNIFICANT CHANGE UP (ref 0.5–1.3)
CREAT SERPL-MCNC: 1.13 MG/DL — SIGNIFICANT CHANGE UP (ref 0.5–1.3)
EOSINOPHIL # BLD AUTO: 0.18 K/UL — SIGNIFICANT CHANGE UP (ref 0–0.5)
EOSINOPHIL NFR BLD AUTO: 1.2 % — SIGNIFICANT CHANGE UP (ref 0–6)
GAS PNL BLDA: SIGNIFICANT CHANGE UP
GAS PNL BLDA: SIGNIFICANT CHANGE UP
GLUCOSE BLDC GLUCOMTR-MCNC: 131 MG/DL — HIGH (ref 70–99)
GLUCOSE BLDC GLUCOMTR-MCNC: 134 MG/DL — HIGH (ref 70–99)
GLUCOSE BLDC GLUCOMTR-MCNC: 135 MG/DL — HIGH (ref 70–99)
GLUCOSE BLDC GLUCOMTR-MCNC: 135 MG/DL — HIGH (ref 70–99)
GLUCOSE BLDC GLUCOMTR-MCNC: 143 MG/DL — HIGH (ref 70–99)
GLUCOSE BLDC GLUCOMTR-MCNC: 149 MG/DL — HIGH (ref 70–99)
GLUCOSE BLDC GLUCOMTR-MCNC: 151 MG/DL — HIGH (ref 70–99)
GLUCOSE BLDC GLUCOMTR-MCNC: 159 MG/DL — HIGH (ref 70–99)
GLUCOSE BLDC GLUCOMTR-MCNC: 160 MG/DL — HIGH (ref 70–99)
GLUCOSE BLDC GLUCOMTR-MCNC: 163 MG/DL — HIGH (ref 70–99)
GLUCOSE SERPL-MCNC: 131 MG/DL — HIGH (ref 70–99)
GLUCOSE SERPL-MCNC: 146 MG/DL — HIGH (ref 70–99)
HCT VFR BLD CALC: 27.9 % — LOW (ref 39–50)
HCT VFR BLD CALC: 40.7 % — SIGNIFICANT CHANGE UP (ref 39–50)
HGB BLD-MCNC: 13.6 G/DL — SIGNIFICANT CHANGE UP (ref 13–17)
HGB BLD-MCNC: 9.7 G/DL — LOW (ref 13–17)
IMM GRANULOCYTES NFR BLD AUTO: 1 % — SIGNIFICANT CHANGE UP (ref 0–1.5)
INR BLD: 1.46 RATIO — HIGH (ref 0.88–1.16)
LYMPHOCYTES # BLD AUTO: 1.44 K/UL — SIGNIFICANT CHANGE UP (ref 1–3.3)
LYMPHOCYTES # BLD AUTO: 9.2 % — LOW (ref 13–44)
MAGNESIUM SERPL-MCNC: 2 MG/DL — SIGNIFICANT CHANGE UP (ref 1.6–2.6)
MAGNESIUM SERPL-MCNC: 2.8 MG/DL — HIGH (ref 1.6–2.6)
MCHC RBC-ENTMCNC: 28.3 PG — SIGNIFICANT CHANGE UP (ref 27–34)
MCHC RBC-ENTMCNC: 28.5 PG — SIGNIFICANT CHANGE UP (ref 27–34)
MCHC RBC-ENTMCNC: 33.4 GM/DL — SIGNIFICANT CHANGE UP (ref 32–36)
MCHC RBC-ENTMCNC: 34.8 GM/DL — SIGNIFICANT CHANGE UP (ref 32–36)
MCV RBC AUTO: 82.1 FL — SIGNIFICANT CHANGE UP (ref 80–100)
MCV RBC AUTO: 84.6 FL — SIGNIFICANT CHANGE UP (ref 80–100)
MONOCYTES # BLD AUTO: 0.68 K/UL — SIGNIFICANT CHANGE UP (ref 0–0.9)
MONOCYTES NFR BLD AUTO: 4.4 % — SIGNIFICANT CHANGE UP (ref 2–14)
NEUTROPHILS # BLD AUTO: 13.12 K/UL — HIGH (ref 1.8–7.4)
NEUTROPHILS NFR BLD AUTO: 83.9 % — HIGH (ref 43–77)
PHOSPHATE SERPL-MCNC: 0.8 MG/DL — CRITICAL LOW (ref 2.4–4.7)
PHOSPHATE SERPL-MCNC: 3.3 MG/DL — SIGNIFICANT CHANGE UP (ref 2.4–4.7)
PLATELET # BLD AUTO: 201 K/UL — SIGNIFICANT CHANGE UP (ref 150–400)
PLATELET # BLD AUTO: 309 K/UL — SIGNIFICANT CHANGE UP (ref 150–400)
POTASSIUM SERPL-MCNC: 3.7 MMOL/L — SIGNIFICANT CHANGE UP (ref 3.5–5.3)
POTASSIUM SERPL-MCNC: 4.3 MMOL/L — SIGNIFICANT CHANGE UP (ref 3.5–5.3)
POTASSIUM SERPL-SCNC: 3.7 MMOL/L — SIGNIFICANT CHANGE UP (ref 3.5–5.3)
POTASSIUM SERPL-SCNC: 4.3 MMOL/L — SIGNIFICANT CHANGE UP (ref 3.5–5.3)
PROT SERPL-MCNC: 5.5 G/DL — LOW (ref 6.6–8.7)
PROT SERPL-MCNC: 7.4 G/DL — SIGNIFICANT CHANGE UP (ref 6.6–8.7)
PROTHROM AB SERPL-ACNC: 16.6 SEC — HIGH (ref 10.6–13.6)
RBC # BLD: 3.4 M/UL — LOW (ref 4.2–5.8)
RBC # BLD: 4.81 M/UL — SIGNIFICANT CHANGE UP (ref 4.2–5.8)
RBC # FLD: 13.2 % — SIGNIFICANT CHANGE UP (ref 10.3–14.5)
RBC # FLD: 13.3 % — SIGNIFICANT CHANGE UP (ref 10.3–14.5)
SODIUM SERPL-SCNC: 140 MMOL/L — SIGNIFICANT CHANGE UP (ref 135–145)
SODIUM SERPL-SCNC: 141 MMOL/L — SIGNIFICANT CHANGE UP (ref 135–145)
TROPONIN T SERPL-MCNC: 0.6 NG/ML — HIGH (ref 0–0.06)
WBC # BLD: 15.61 K/UL — HIGH (ref 3.8–10.5)
WBC # BLD: 9.85 K/UL — SIGNIFICANT CHANGE UP (ref 3.8–10.5)
WBC # FLD AUTO: 15.61 K/UL — HIGH (ref 3.8–10.5)
WBC # FLD AUTO: 9.85 K/UL — SIGNIFICANT CHANGE UP (ref 3.8–10.5)

## 2020-08-20 PROCEDURE — 33572 OPEN CORONARY ENDARTERECTOMY: CPT

## 2020-08-20 PROCEDURE — 33533 CABG ARTERIAL SINGLE: CPT | Mod: AS

## 2020-08-20 PROCEDURE — 33518 CABG ARTERY-VEIN TWO: CPT | Mod: AS

## 2020-08-20 PROCEDURE — 88311 DECALCIFY TISSUE: CPT | Mod: 26

## 2020-08-20 PROCEDURE — 33508 ENDOSCOPIC VEIN HARVEST: CPT

## 2020-08-20 PROCEDURE — 93010 ELECTROCARDIOGRAM REPORT: CPT

## 2020-08-20 PROCEDURE — 33572 OPEN CORONARY ENDARTERECTOMY: CPT | Mod: AS

## 2020-08-20 PROCEDURE — 71045 X-RAY EXAM CHEST 1 VIEW: CPT | Mod: 26

## 2020-08-20 PROCEDURE — 33533 CABG ARTERIAL SINGLE: CPT

## 2020-08-20 PROCEDURE — 33518 CABG ARTERY-VEIN TWO: CPT

## 2020-08-20 PROCEDURE — 76998 US GUIDE INTRAOP: CPT | Mod: 26,59

## 2020-08-20 PROCEDURE — 88304 TISSUE EXAM BY PATHOLOGIST: CPT | Mod: 26

## 2020-08-20 PROCEDURE — 76998 US GUIDE INTRAOP: CPT | Mod: 26

## 2020-08-20 RX ORDER — PANTOPRAZOLE SODIUM 20 MG/1
40 TABLET, DELAYED RELEASE ORAL ONCE
Refills: 0 | Status: COMPLETED | OUTPATIENT
Start: 2020-08-21 | End: 2020-08-21

## 2020-08-20 RX ORDER — SODIUM CHLORIDE 9 MG/ML
1000 INJECTION INTRAMUSCULAR; INTRAVENOUS; SUBCUTANEOUS
Refills: 0 | Status: DISCONTINUED | OUTPATIENT
Start: 2020-08-20 | End: 2020-08-22

## 2020-08-20 RX ORDER — POTASSIUM CHLORIDE 20 MEQ
10 PACKET (EA) ORAL
Refills: 0 | Status: DISCONTINUED | OUTPATIENT
Start: 2020-08-20 | End: 2020-08-21

## 2020-08-20 RX ORDER — ACETAMINOPHEN 500 MG
650 TABLET ORAL EVERY 6 HOURS
Refills: 0 | Status: DISCONTINUED | OUTPATIENT
Start: 2020-08-20 | End: 2020-08-21

## 2020-08-20 RX ORDER — SODIUM CHLORIDE 9 MG/ML
500 INJECTION, SOLUTION INTRAVENOUS ONCE
Refills: 0 | Status: COMPLETED | OUTPATIENT
Start: 2020-08-20 | End: 2020-08-20

## 2020-08-20 RX ORDER — VANCOMYCIN HCL 1 G
1000 VIAL (EA) INTRAVENOUS
Refills: 0 | Status: COMPLETED | OUTPATIENT
Start: 2020-08-20 | End: 2020-08-22

## 2020-08-20 RX ORDER — DEXMEDETOMIDINE HYDROCHLORIDE IN 0.9% SODIUM CHLORIDE 4 UG/ML
0.5 INJECTION INTRAVENOUS
Qty: 200 | Refills: 0 | Status: DISCONTINUED | OUTPATIENT
Start: 2020-08-20 | End: 2020-08-20

## 2020-08-20 RX ORDER — CHLORHEXIDINE GLUCONATE 213 G/1000ML
1 SOLUTION TOPICAL DAILY
Refills: 0 | Status: DISCONTINUED | OUTPATIENT
Start: 2020-08-20 | End: 2020-08-25

## 2020-08-20 RX ORDER — CEFUROXIME AXETIL 250 MG
1500 TABLET ORAL EVERY 8 HOURS
Refills: 0 | Status: COMPLETED | OUTPATIENT
Start: 2020-08-20 | End: 2020-08-22

## 2020-08-20 RX ORDER — MEPERIDINE HYDROCHLORIDE 50 MG/ML
25 INJECTION INTRAMUSCULAR; INTRAVENOUS; SUBCUTANEOUS ONCE
Refills: 0 | Status: DISCONTINUED | OUTPATIENT
Start: 2020-08-20 | End: 2020-08-20

## 2020-08-20 RX ORDER — DESMOPRESSIN ACETATE 0.1 MG/1
29 TABLET ORAL ONCE
Refills: 0 | Status: DISCONTINUED | OUTPATIENT
Start: 2020-08-20 | End: 2020-08-20

## 2020-08-20 RX ORDER — ASPIRIN/CALCIUM CARB/MAGNESIUM 324 MG
81 TABLET ORAL DAILY
Refills: 0 | Status: DISCONTINUED | OUTPATIENT
Start: 2020-08-20 | End: 2020-08-25

## 2020-08-20 RX ORDER — CLOPIDOGREL BISULFATE 75 MG/1
75 TABLET, FILM COATED ORAL DAILY
Refills: 0 | Status: DISCONTINUED | OUTPATIENT
Start: 2020-08-20 | End: 2020-08-25

## 2020-08-20 RX ORDER — POTASSIUM CHLORIDE 20 MEQ
10 PACKET (EA) ORAL
Refills: 0 | Status: COMPLETED | OUTPATIENT
Start: 2020-08-20 | End: 2020-08-20

## 2020-08-20 RX ORDER — ACETAMINOPHEN 500 MG
1000 TABLET ORAL ONCE
Refills: 0 | Status: COMPLETED | OUTPATIENT
Start: 2020-08-20 | End: 2020-08-20

## 2020-08-20 RX ORDER — FENTANYL CITRATE 50 UG/ML
50 INJECTION INTRAVENOUS ONCE
Refills: 0 | Status: DISCONTINUED | OUTPATIENT
Start: 2020-08-20 | End: 2020-08-20

## 2020-08-20 RX ORDER — INSULIN HUMAN 100 [IU]/ML
2 INJECTION, SOLUTION SUBCUTANEOUS
Qty: 100 | Refills: 0 | Status: DISCONTINUED | OUTPATIENT
Start: 2020-08-20 | End: 2020-08-21

## 2020-08-20 RX ORDER — OXYCODONE AND ACETAMINOPHEN 5; 325 MG/1; MG/1
2 TABLET ORAL EVERY 6 HOURS
Refills: 0 | Status: DISCONTINUED | OUTPATIENT
Start: 2020-08-21 | End: 2020-08-21

## 2020-08-20 RX ORDER — NOREPINEPHRINE BITARTRATE/D5W 8 MG/250ML
0.05 PLASTIC BAG, INJECTION (ML) INTRAVENOUS
Qty: 8 | Refills: 0 | Status: DISCONTINUED | OUTPATIENT
Start: 2020-08-20 | End: 2020-08-21

## 2020-08-20 RX ORDER — POLYETHYLENE GLYCOL 3350 17 G/17G
17 POWDER, FOR SOLUTION ORAL DAILY
Refills: 0 | Status: DISCONTINUED | OUTPATIENT
Start: 2020-08-20 | End: 2020-08-25

## 2020-08-20 RX ORDER — DEXTROSE 50 % IN WATER 50 %
50 SYRINGE (ML) INTRAVENOUS
Refills: 0 | Status: DISCONTINUED | OUTPATIENT
Start: 2020-08-20 | End: 2020-08-24

## 2020-08-20 RX ORDER — CHLORHEXIDINE GLUCONATE 213 G/1000ML
15 SOLUTION TOPICAL EVERY 12 HOURS
Refills: 0 | Status: DISCONTINUED | OUTPATIENT
Start: 2020-08-20 | End: 2020-08-20

## 2020-08-20 RX ORDER — CEFUROXIME AXETIL 250 MG
1500 TABLET ORAL EVERY 8 HOURS
Refills: 0 | Status: DISCONTINUED | OUTPATIENT
Start: 2020-08-20 | End: 2020-08-20

## 2020-08-20 RX ORDER — HYDROMORPHONE HYDROCHLORIDE 2 MG/ML
0.25 INJECTION INTRAMUSCULAR; INTRAVENOUS; SUBCUTANEOUS ONCE
Refills: 0 | Status: DISCONTINUED | OUTPATIENT
Start: 2020-08-20 | End: 2020-08-20

## 2020-08-20 RX ORDER — PANTOPRAZOLE SODIUM 20 MG/1
40 TABLET, DELAYED RELEASE ORAL DAILY
Refills: 0 | Status: DISCONTINUED | OUTPATIENT
Start: 2020-08-20 | End: 2020-08-21

## 2020-08-20 RX ORDER — DEXTROSE 50 % IN WATER 50 %
25 SYRINGE (ML) INTRAVENOUS
Refills: 0 | Status: DISCONTINUED | OUTPATIENT
Start: 2020-08-20 | End: 2020-08-24

## 2020-08-20 RX ORDER — OXYCODONE AND ACETAMINOPHEN 5; 325 MG/1; MG/1
1 TABLET ORAL EVERY 4 HOURS
Refills: 0 | Status: DISCONTINUED | OUTPATIENT
Start: 2020-08-21 | End: 2020-08-21

## 2020-08-20 RX ORDER — MORPHINE SULFATE 50 MG/1
1 CAPSULE, EXTENDED RELEASE ORAL EVERY 4 HOURS
Refills: 0 | Status: DISCONTINUED | OUTPATIENT
Start: 2020-08-20 | End: 2020-08-21

## 2020-08-20 RX ORDER — CHLORHEXIDINE GLUCONATE 213 G/1000ML
5 SOLUTION TOPICAL EVERY 4 HOURS
Refills: 0 | Status: DISCONTINUED | OUTPATIENT
Start: 2020-08-20 | End: 2020-08-20

## 2020-08-20 RX ORDER — PROPOFOL 10 MG/ML
5 INJECTION, EMULSION INTRAVENOUS
Qty: 1000 | Refills: 0 | Status: DISCONTINUED | OUTPATIENT
Start: 2020-08-20 | End: 2020-08-20

## 2020-08-20 RX ORDER — VANCOMYCIN HCL 1 G
1000 VIAL (EA) INTRAVENOUS EVERY 12 HOURS
Refills: 0 | Status: DISCONTINUED | OUTPATIENT
Start: 2020-08-20 | End: 2020-08-20

## 2020-08-20 RX ORDER — VANCOMYCIN HCL 1 G
1000 VIAL (EA) INTRAVENOUS DAILY
Refills: 0 | Status: DISCONTINUED | OUTPATIENT
Start: 2020-08-20 | End: 2020-08-20

## 2020-08-20 RX ORDER — ALBUMIN HUMAN 25 %
100 VIAL (ML) INTRAVENOUS
Refills: 0 | Status: DISCONTINUED | OUTPATIENT
Start: 2020-08-20 | End: 2020-08-21

## 2020-08-20 RX ADMIN — MORPHINE SULFATE 1 MILLIGRAM(S): 50 CAPSULE, EXTENDED RELEASE ORAL at 18:13

## 2020-08-20 RX ADMIN — Medication 400 MILLIGRAM(S): at 15:04

## 2020-08-20 RX ADMIN — SODIUM CHLORIDE 10 MILLILITER(S): 9 INJECTION INTRAMUSCULAR; INTRAVENOUS; SUBCUTANEOUS at 15:40

## 2020-08-20 RX ADMIN — PANTOPRAZOLE SODIUM 40 MILLIGRAM(S): 20 TABLET, DELAYED RELEASE ORAL at 05:53

## 2020-08-20 RX ADMIN — PROPOFOL 2.87 MICROGRAM(S)/KG/MIN: 10 INJECTION, EMULSION INTRAVENOUS at 15:42

## 2020-08-20 RX ADMIN — Medication 1000 MILLIGRAM(S): at 15:34

## 2020-08-20 RX ADMIN — FENTANYL CITRATE 50 MICROGRAM(S): 50 INJECTION INTRAVENOUS at 14:07

## 2020-08-20 RX ADMIN — Medication 100 MILLIEQUIVALENT(S): at 18:59

## 2020-08-20 RX ADMIN — Medication 250 MILLIGRAM(S): at 20:02

## 2020-08-20 RX ADMIN — PANTOPRAZOLE SODIUM 40 MILLIGRAM(S): 20 TABLET, DELAYED RELEASE ORAL at 15:05

## 2020-08-20 RX ADMIN — Medication 8.95 MICROGRAM(S)/KG/MIN: at 15:41

## 2020-08-20 RX ADMIN — Medication 81 MILLIGRAM(S): at 18:15

## 2020-08-20 RX ADMIN — CHLORHEXIDINE GLUCONATE 15 MILLILITER(S): 213 SOLUTION TOPICAL at 05:53

## 2020-08-20 RX ADMIN — HYDROMORPHONE HYDROCHLORIDE 0.25 MILLIGRAM(S): 2 INJECTION INTRAMUSCULAR; INTRAVENOUS; SUBCUTANEOUS at 19:58

## 2020-08-20 RX ADMIN — SODIUM CHLORIDE 3 MILLILITER(S): 9 INJECTION INTRAMUSCULAR; INTRAVENOUS; SUBCUTANEOUS at 05:54

## 2020-08-20 RX ADMIN — CHLORHEXIDINE GLUCONATE 1 APPLICATION(S): 213 SOLUTION TOPICAL at 05:53

## 2020-08-20 RX ADMIN — FENTANYL CITRATE 50 MICROGRAM(S): 50 INJECTION INTRAVENOUS at 13:52

## 2020-08-20 RX ADMIN — HYDROMORPHONE HYDROCHLORIDE 0.25 MILLIGRAM(S): 2 INJECTION INTRAMUSCULAR; INTRAVENOUS; SUBCUTANEOUS at 20:13

## 2020-08-20 RX ADMIN — Medication 100 MILLIGRAM(S): at 20:02

## 2020-08-20 RX ADMIN — Medication 25 MILLIGRAM(S): at 05:53

## 2020-08-20 RX ADMIN — INSULIN HUMAN 2 UNIT(S)/HR: 100 INJECTION, SOLUTION SUBCUTANEOUS at 15:41

## 2020-08-20 RX ADMIN — Medication 100 MILLIEQUIVALENT(S): at 20:02

## 2020-08-20 RX ADMIN — CHLORHEXIDINE GLUCONATE 1 APPLICATION(S): 213 SOLUTION TOPICAL at 18:17

## 2020-08-20 RX ADMIN — Medication 100 MILLIEQUIVALENT(S): at 15:08

## 2020-08-20 RX ADMIN — CLOPIDOGREL BISULFATE 75 MILLIGRAM(S): 75 TABLET, FILM COATED ORAL at 18:15

## 2020-08-20 RX ADMIN — SODIUM CHLORIDE 2000 MILLILITER(S): 9 INJECTION, SOLUTION INTRAVENOUS at 17:45

## 2020-08-20 RX ADMIN — MORPHINE SULFATE 1 MILLIGRAM(S): 50 CAPSULE, EXTENDED RELEASE ORAL at 18:28

## 2020-08-20 NOTE — BRIEF OPERATIVE NOTE - COMMENTS
Cell Saver Utilized - Unable to Quantify Blood Loss  Invasive Lines: Rt Radial Arlene, Rt IJ Cental Line  IV Medication Infusions: Levo, Propofol, Insulin  No qualified resident was available to assist in this case. I have personally first assisted the Cardiothoracic Surgeon listed in this brief op note throughout the entirety of this case.   Pt tolerated procedure well, Transferred to [facility]ICU

## 2020-08-20 NOTE — BRIEF OPERATIVE NOTE - NSICDXBRIEFPROCEDURE_GEN_ALL_CORE_FT
PROCEDURES:  Coronary endarterectomy 20-Aug-2020 13:25:01  Pb Ambrocio  CABG, 1 arterial and 2 venous 20-Aug-2020 13:24:52  Pb Ambrocio

## 2020-08-21 LAB
ALBUMIN SERPL ELPH-MCNC: 4.2 G/DL — SIGNIFICANT CHANGE UP (ref 3.3–5.2)
ALP SERPL-CCNC: 44 U/L — SIGNIFICANT CHANGE UP (ref 40–120)
ALT FLD-CCNC: 23 U/L — SIGNIFICANT CHANGE UP
ANION GAP SERPL CALC-SCNC: 16 MMOL/L — SIGNIFICANT CHANGE UP (ref 5–17)
APTT BLD: 28.6 SEC — SIGNIFICANT CHANGE UP (ref 27.5–35.5)
AST SERPL-CCNC: 47 U/L — HIGH
BASOPHILS # BLD AUTO: 0.01 K/UL — SIGNIFICANT CHANGE UP (ref 0–0.2)
BASOPHILS NFR BLD AUTO: 0.1 % — SIGNIFICANT CHANGE UP (ref 0–2)
BILIRUB SERPL-MCNC: 0.5 MG/DL — SIGNIFICANT CHANGE UP (ref 0.4–2)
BUN SERPL-MCNC: 16 MG/DL — SIGNIFICANT CHANGE UP (ref 8–20)
CALCIUM SERPL-MCNC: 8.8 MG/DL — SIGNIFICANT CHANGE UP (ref 8.6–10.2)
CHLORIDE SERPL-SCNC: 105 MMOL/L — SIGNIFICANT CHANGE UP (ref 98–107)
CK MB CFR SERPL CALC: 26.1 NG/ML — HIGH (ref 0–6.7)
CK SERPL-CCNC: 447 U/L — HIGH (ref 30–200)
CO2 SERPL-SCNC: 20 MMOL/L — LOW (ref 22–29)
CREAT SERPL-MCNC: 1.1 MG/DL — SIGNIFICANT CHANGE UP (ref 0.5–1.3)
EOSINOPHIL # BLD AUTO: 0 K/UL — SIGNIFICANT CHANGE UP (ref 0–0.5)
EOSINOPHIL NFR BLD AUTO: 0 % — SIGNIFICANT CHANGE UP (ref 0–6)
GLUCOSE BLDC GLUCOMTR-MCNC: 132 MG/DL — HIGH (ref 70–99)
GLUCOSE BLDC GLUCOMTR-MCNC: 132 MG/DL — HIGH (ref 70–99)
GLUCOSE BLDC GLUCOMTR-MCNC: 134 MG/DL — HIGH (ref 70–99)
GLUCOSE BLDC GLUCOMTR-MCNC: 136 MG/DL — HIGH (ref 70–99)
GLUCOSE BLDC GLUCOMTR-MCNC: 137 MG/DL — HIGH (ref 70–99)
GLUCOSE BLDC GLUCOMTR-MCNC: 138 MG/DL — HIGH (ref 70–99)
GLUCOSE BLDC GLUCOMTR-MCNC: 139 MG/DL — HIGH (ref 70–99)
GLUCOSE BLDC GLUCOMTR-MCNC: 146 MG/DL — HIGH (ref 70–99)
GLUCOSE BLDC GLUCOMTR-MCNC: 147 MG/DL — HIGH (ref 70–99)
GLUCOSE BLDC GLUCOMTR-MCNC: 148 MG/DL — HIGH (ref 70–99)
GLUCOSE BLDC GLUCOMTR-MCNC: 150 MG/DL — HIGH (ref 70–99)
GLUCOSE BLDC GLUCOMTR-MCNC: 152 MG/DL — HIGH (ref 70–99)
GLUCOSE BLDC GLUCOMTR-MCNC: 156 MG/DL — HIGH (ref 70–99)
GLUCOSE BLDC GLUCOMTR-MCNC: 162 MG/DL — HIGH (ref 70–99)
GLUCOSE BLDC GLUCOMTR-MCNC: 166 MG/DL — HIGH (ref 70–99)
GLUCOSE SERPL-MCNC: 132 MG/DL — HIGH (ref 70–99)
HCT VFR BLD CALC: 29.2 % — LOW (ref 39–50)
HGB BLD-MCNC: 9.8 G/DL — LOW (ref 13–17)
IMM GRANULOCYTES NFR BLD AUTO: 0.7 % — SIGNIFICANT CHANGE UP (ref 0–1.5)
INR BLD: 1.35 RATIO — HIGH (ref 0.88–1.16)
LYMPHOCYTES # BLD AUTO: 0.89 K/UL — LOW (ref 1–3.3)
LYMPHOCYTES # BLD AUTO: 5.7 % — LOW (ref 13–44)
MAGNESIUM SERPL-MCNC: 2.3 MG/DL — SIGNIFICANT CHANGE UP (ref 1.6–2.6)
MCHC RBC-ENTMCNC: 28.1 PG — SIGNIFICANT CHANGE UP (ref 27–34)
MCHC RBC-ENTMCNC: 33.6 GM/DL — SIGNIFICANT CHANGE UP (ref 32–36)
MCV RBC AUTO: 83.7 FL — SIGNIFICANT CHANGE UP (ref 80–100)
MONOCYTES # BLD AUTO: 0.98 K/UL — HIGH (ref 0–0.9)
MONOCYTES NFR BLD AUTO: 6.3 % — SIGNIFICANT CHANGE UP (ref 2–14)
NEUTROPHILS # BLD AUTO: 13.5 K/UL — HIGH (ref 1.8–7.4)
NEUTROPHILS NFR BLD AUTO: 87.2 % — HIGH (ref 43–77)
PLATELET # BLD AUTO: 249 K/UL — SIGNIFICANT CHANGE UP (ref 150–400)
POTASSIUM SERPL-MCNC: 4.5 MMOL/L — SIGNIFICANT CHANGE UP (ref 3.5–5.3)
POTASSIUM SERPL-SCNC: 4.5 MMOL/L — SIGNIFICANT CHANGE UP (ref 3.5–5.3)
PROT SERPL-MCNC: 5.9 G/DL — LOW (ref 6.6–8.7)
PROTHROM AB SERPL-ACNC: 15.4 SEC — HIGH (ref 10.6–13.6)
RBC # BLD: 3.49 M/UL — LOW (ref 4.2–5.8)
RBC # FLD: 13.5 % — SIGNIFICANT CHANGE UP (ref 10.3–14.5)
SODIUM SERPL-SCNC: 141 MMOL/L — SIGNIFICANT CHANGE UP (ref 135–145)
TROPONIN T SERPL-MCNC: 0.57 NG/ML — HIGH (ref 0–0.06)
WBC # BLD: 15.49 K/UL — HIGH (ref 3.8–10.5)
WBC # FLD AUTO: 15.49 K/UL — HIGH (ref 3.8–10.5)

## 2020-08-21 PROCEDURE — 71045 X-RAY EXAM CHEST 1 VIEW: CPT | Mod: 26

## 2020-08-21 PROCEDURE — 93010 ELECTROCARDIOGRAM REPORT: CPT

## 2020-08-21 RX ORDER — SODIUM CHLORIDE 9 MG/ML
1000 INJECTION, SOLUTION INTRAVENOUS
Refills: 0 | Status: DISCONTINUED | OUTPATIENT
Start: 2020-08-21 | End: 2020-08-24

## 2020-08-21 RX ORDER — OXYCODONE AND ACETAMINOPHEN 5; 325 MG/1; MG/1
1 TABLET ORAL EVERY 4 HOURS
Refills: 0 | Status: DISCONTINUED | OUTPATIENT
Start: 2020-08-21 | End: 2020-08-25

## 2020-08-21 RX ORDER — METOPROLOL TARTRATE 50 MG
25 TABLET ORAL
Refills: 0 | Status: DISCONTINUED | OUTPATIENT
Start: 2020-08-21 | End: 2020-08-22

## 2020-08-21 RX ORDER — INSULIN LISPRO 100/ML
2 VIAL (ML) SUBCUTANEOUS
Refills: 0 | Status: DISCONTINUED | OUTPATIENT
Start: 2020-08-21 | End: 2020-08-22

## 2020-08-21 RX ORDER — INSULIN GLARGINE 100 [IU]/ML
15 INJECTION, SOLUTION SUBCUTANEOUS AT BEDTIME
Refills: 0 | Status: DISCONTINUED | OUTPATIENT
Start: 2020-08-21 | End: 2020-08-23

## 2020-08-21 RX ORDER — METOCLOPRAMIDE HCL 10 MG
10 TABLET ORAL EVERY 8 HOURS
Refills: 0 | Status: COMPLETED | OUTPATIENT
Start: 2020-08-21 | End: 2020-08-21

## 2020-08-21 RX ORDER — INSULIN LISPRO 100/ML
VIAL (ML) SUBCUTANEOUS
Refills: 0 | Status: DISCONTINUED | OUTPATIENT
Start: 2020-08-21 | End: 2020-08-24

## 2020-08-21 RX ORDER — ATORVASTATIN CALCIUM 80 MG/1
80 TABLET, FILM COATED ORAL AT BEDTIME
Refills: 0 | Status: DISCONTINUED | OUTPATIENT
Start: 2020-08-21 | End: 2020-08-25

## 2020-08-21 RX ORDER — GLUCAGON INJECTION, SOLUTION 0.5 MG/.1ML
1 INJECTION, SOLUTION SUBCUTANEOUS ONCE
Refills: 0 | Status: DISCONTINUED | OUTPATIENT
Start: 2020-08-21 | End: 2020-08-25

## 2020-08-21 RX ORDER — ACETAMINOPHEN 500 MG
1000 TABLET ORAL ONCE
Refills: 0 | Status: COMPLETED | OUTPATIENT
Start: 2020-08-21 | End: 2020-08-21

## 2020-08-21 RX ORDER — SENNA PLUS 8.6 MG/1
2 TABLET ORAL AT BEDTIME
Refills: 0 | Status: DISCONTINUED | OUTPATIENT
Start: 2020-08-21 | End: 2020-08-25

## 2020-08-21 RX ORDER — ENOXAPARIN SODIUM 100 MG/ML
40 INJECTION SUBCUTANEOUS DAILY
Refills: 0 | Status: DISCONTINUED | OUTPATIENT
Start: 2020-08-21 | End: 2020-08-24

## 2020-08-21 RX ORDER — SODIUM CHLORIDE 9 MG/ML
1000 INJECTION INTRAMUSCULAR; INTRAVENOUS; SUBCUTANEOUS
Refills: 0 | Status: DISCONTINUED | OUTPATIENT
Start: 2020-08-21 | End: 2020-08-22

## 2020-08-21 RX ORDER — LANOLIN ALCOHOL/MO/W.PET/CERES
5 CREAM (GRAM) TOPICAL AT BEDTIME
Refills: 0 | Status: DISCONTINUED | OUTPATIENT
Start: 2020-08-21 | End: 2020-08-25

## 2020-08-21 RX ORDER — WARFARIN SODIUM 2.5 MG/1
2.5 TABLET ORAL ONCE
Refills: 0 | Status: COMPLETED | OUTPATIENT
Start: 2020-08-22 | End: 2020-08-22

## 2020-08-21 RX ORDER — OXYCODONE AND ACETAMINOPHEN 5; 325 MG/1; MG/1
2 TABLET ORAL EVERY 4 HOURS
Refills: 0 | Status: DISCONTINUED | OUTPATIENT
Start: 2020-08-21 | End: 2020-08-25

## 2020-08-21 RX ORDER — DEXTROSE 50 % IN WATER 50 %
15 SYRINGE (ML) INTRAVENOUS ONCE
Refills: 0 | Status: DISCONTINUED | OUTPATIENT
Start: 2020-08-21 | End: 2020-08-24

## 2020-08-21 RX ORDER — MORPHINE SULFATE 50 MG/1
2 CAPSULE, EXTENDED RELEASE ORAL ONCE
Refills: 0 | Status: DISCONTINUED | OUTPATIENT
Start: 2020-08-21 | End: 2020-08-21

## 2020-08-21 RX ADMIN — CLOPIDOGREL BISULFATE 75 MILLIGRAM(S): 75 TABLET, FILM COATED ORAL at 11:38

## 2020-08-21 RX ADMIN — Medication 10 MILLIGRAM(S): at 15:06

## 2020-08-21 RX ADMIN — MORPHINE SULFATE 2 MILLIGRAM(S): 50 CAPSULE, EXTENDED RELEASE ORAL at 09:50

## 2020-08-21 RX ADMIN — MORPHINE SULFATE 2 MILLIGRAM(S): 50 CAPSULE, EXTENDED RELEASE ORAL at 09:35

## 2020-08-21 RX ADMIN — Medication 2 UNIT(S): at 09:46

## 2020-08-21 RX ADMIN — Medication 10 MILLIGRAM(S): at 08:32

## 2020-08-21 RX ADMIN — OXYCODONE AND ACETAMINOPHEN 1 TABLET(S): 5; 325 TABLET ORAL at 14:23

## 2020-08-21 RX ADMIN — MORPHINE SULFATE 1 MILLIGRAM(S): 50 CAPSULE, EXTENDED RELEASE ORAL at 05:15

## 2020-08-21 RX ADMIN — Medication 10 MILLIGRAM(S): at 21:16

## 2020-08-21 RX ADMIN — OXYCODONE AND ACETAMINOPHEN 2 TABLET(S): 5; 325 TABLET ORAL at 18:55

## 2020-08-21 RX ADMIN — Medication 2 UNIT(S): at 16:30

## 2020-08-21 RX ADMIN — OXYCODONE AND ACETAMINOPHEN 2 TABLET(S): 5; 325 TABLET ORAL at 19:55

## 2020-08-21 RX ADMIN — Medication 1000 MILLIGRAM(S): at 03:35

## 2020-08-21 RX ADMIN — ATORVASTATIN CALCIUM 80 MILLIGRAM(S): 80 TABLET, FILM COATED ORAL at 21:16

## 2020-08-21 RX ADMIN — ENOXAPARIN SODIUM 40 MILLIGRAM(S): 100 INJECTION SUBCUTANEOUS at 11:46

## 2020-08-21 RX ADMIN — Medication 250 MILLIGRAM(S): at 17:17

## 2020-08-21 RX ADMIN — MORPHINE SULFATE 1 MILLIGRAM(S): 50 CAPSULE, EXTENDED RELEASE ORAL at 00:40

## 2020-08-21 RX ADMIN — Medication 81 MILLIGRAM(S): at 11:38

## 2020-08-21 RX ADMIN — Medication 5 MILLIGRAM(S): at 21:17

## 2020-08-21 RX ADMIN — POLYETHYLENE GLYCOL 3350 17 GRAM(S): 17 POWDER, FOR SOLUTION ORAL at 11:38

## 2020-08-21 RX ADMIN — OXYCODONE AND ACETAMINOPHEN 2 TABLET(S): 5; 325 TABLET ORAL at 23:24

## 2020-08-21 RX ADMIN — MORPHINE SULFATE 1 MILLIGRAM(S): 50 CAPSULE, EXTENDED RELEASE ORAL at 00:25

## 2020-08-21 RX ADMIN — Medication 100 MILLIGRAM(S): at 11:46

## 2020-08-21 RX ADMIN — Medication 25 MILLIGRAM(S): at 08:32

## 2020-08-21 RX ADMIN — INSULIN GLARGINE 15 UNIT(S): 100 INJECTION, SOLUTION SUBCUTANEOUS at 21:17

## 2020-08-21 RX ADMIN — Medication 2 UNIT(S): at 11:38

## 2020-08-21 RX ADMIN — Medication 400 MILLIGRAM(S): at 03:05

## 2020-08-21 RX ADMIN — Medication 250 MILLIGRAM(S): at 05:57

## 2020-08-21 RX ADMIN — Medication 100 MILLIGRAM(S): at 21:16

## 2020-08-21 RX ADMIN — Medication 25 MILLIGRAM(S): at 17:17

## 2020-08-21 RX ADMIN — PANTOPRAZOLE SODIUM 40 MILLIGRAM(S): 20 TABLET, DELAYED RELEASE ORAL at 03:35

## 2020-08-21 RX ADMIN — Medication 100 MILLIGRAM(S): at 03:33

## 2020-08-21 RX ADMIN — SENNA PLUS 2 TABLET(S): 8.6 TABLET ORAL at 21:16

## 2020-08-21 RX ADMIN — MORPHINE SULFATE 1 MILLIGRAM(S): 50 CAPSULE, EXTENDED RELEASE ORAL at 05:30

## 2020-08-21 RX ADMIN — CHLORHEXIDINE GLUCONATE 1 APPLICATION(S): 213 SOLUTION TOPICAL at 11:39

## 2020-08-21 RX ADMIN — OXYCODONE AND ACETAMINOPHEN 1 TABLET(S): 5; 325 TABLET ORAL at 15:23

## 2020-08-21 NOTE — PHYSICAL THERAPY INITIAL EVALUATION ADULT - ADDITIONAL COMMENTS
Pt. states he will be staying in a private home upon discharge with 3 steps to enter. Wife will be home to assist.

## 2020-08-21 NOTE — PHYSICAL THERAPY INITIAL EVALUATION ADULT - ADL SKILLS, REHAB EVAL
Please write a prescription for Proair HFA inhaler, 2 puffs every 4 hours as needed for wheezing. Take 2 puffs prior to exercise if needed. Give 3 refills. Please send or call in the prescription.   independent

## 2020-08-21 NOTE — PROGRESS NOTE ADULT - SUBJECTIVE AND OBJECTIVE BOX
Subjective:  55yo M resting comfortable, no c/o pain      HPI:  54 year old Male with PMH of DM type II, HLD transferred from [facility] s/p cardiac cath which reveals Triple vessel disease. Patient presented to [facility] [place] with sudden onset of chest pressure and tightness radiating to neck and left arm, onset during rest, improved after Aspirin 81 mg x4 by EMS. As per patient he noticed to have mild to moderate  activity intolerance over 1 year, worsened in past week to a level that he has to stop between minimal activities of daily living. Patient denies fever, chills, shortness of breath, dizziness, headache, abdominal pain, N/V/D. Patient denies lower extremity edema/pain, recent long distance travel or sick contact. Patient denies exposure to COVID 19 but his wife recently positive for COVID antibodies. Patient COVID PCR from [facility] tested negative on 2020. (17 Aug 2020 21:14)          PAST MEDICAL & SURGICAL HISTORY:  Hyperlipidemia  Diabetes mellitus, type 2          MEDICATIONS  (STANDING):  albumin human 25% IVPB 100 milliLiter(s) IV Intermittent every 1 hour  aspirin  chewable 81 milliGRAM(s) Oral daily  cefuroxime  IVPB 1500 milliGRAM(s) IV Intermittent every 8 hours  chlorhexidine 2% Cloths 1 Application(s) Topical daily  clopidogrel Tablet 75 milliGRAM(s) Oral daily  dextrose 50% Injectable 50 milliLiter(s) IV Push every 15 minutes  dextrose 50% Injectable 25 milliLiter(s) IV Push every 15 minutes  insulin regular Infusion 2 Unit(s)/Hr (2 mL/Hr) IV Continuous <Continuous>  norepinephrine Infusion 0.05 MICROgram(s)/kG/Min (8.95 mL/Hr) IV Continuous <Continuous>  pantoprazole    Tablet 40 milliGRAM(s) Oral once  pantoprazole  Injectable 40 milliGRAM(s) IV Push daily  polyethylene glycol 3350 17 Gram(s) Oral daily  potassium chloride  10 mEq/50 mL IVPB 10 milliEquivalent(s) IV Intermittent every 1 hour  potassium chloride  10 mEq/50 mL IVPB 10 milliEquivalent(s) IV Intermittent every 1 hour  potassium chloride  10 mEq/50 mL IVPB 10 milliEquivalent(s) IV Intermittent every 1 hour  sodium chloride 0.9%. 1000 milliLiter(s) (10 mL/Hr) IV Continuous <Continuous>  vancomycin  IVPB 1000 milliGRAM(s) IV Intermittent once  vancomycin  IVPB 1000 milliGRAM(s) IV Intermittent two times a day    MEDICATIONS  (PRN):  acetaminophen   Tablet .. 650 milliGRAM(s) Oral every 6 hours PRN Mild Pain (1 - 3)  melatonin 5 milliGRAM(s) Oral at bedtime PRN Insomnia  morphine  - Injectable 1 milliGRAM(s) IV Push every 4 hours PRN Moderate Pain (4 - 6)  oxycodone    5 mG/acetaminophen 325 mG 1 Tablet(s) Oral every 4 hours PRN Mild Pain (1 - 3)  oxycodone    5 mG/acetaminophen 325 mG 2 Tablet(s) Oral every 6 hours PRN Moderate Pain (4 - 6)          Allergies    No Known Allergies    Intolerances          WEIGHTS:  Daily Height in cm: 177.8 (20 Aug 2020 07:25)    Daily Weight in k.4 (20 Aug 2020 06:22)  Admit Wt: Drug Dosing Weight  Height (cm): 177.8 (20 Aug 2020 07:25)  Weight (kg): 95.5 (20 Aug 2020 07:25)  BMI (kg/m2): 30.2 (20 Aug 2020 07:25)  BSA (m2): 2.13 (20 Aug 2020 07:25)  I&O's Summary    19 Aug 2020 07:01  -  20 Aug 2020 07:00  --------------------------------------------------------  IN: 480 mL / OUT: 1425 mL / NET: -945 mL    20 Aug 2020 07:01  -  21 Aug 2020 02:57  --------------------------------------------------------  IN: 1441.9 mL / OUT: 2865 mL / NET: -1423.1 mL        VITAL SIGNS:  ICU Vital Signs Last 24 Hrs  T(C): 37.9 (21 Aug 2020 02:00), Max: 38.1 (21 Aug 2020 00:01)  T(F): 100.2 (21 Aug 2020 02:00), Max: 100.6 (21 Aug 2020 00:01)  HR: 94 (21 Aug 2020 02:00) (60 - 98)  BP: 138/88 (20 Aug 2020 15:55) (116/77 - 164/84)  BP(mean): 108 (20 Aug 2020 15:55) (90 - 108)  ABP: 145/80 (21 Aug 2020 02:00) (114/66 - 164/86)  ABP(mean): 102 (21 Aug 2020 02:00) (79 - 111)  RR: 21 (21 Aug 2020 02:00) (10 - 29)  SpO2: 99% (21 Aug 2020 02:00) (98% - 100%)        All laboratory results, radiology and medications reviewed.    LABS:      140  |  102  |  17.0  ----------------------------<  131<H>  4.3   |  23.0  |  0.96    Ca    9.2      20 Aug 2020 13:23  Phos  0.8       Mg     2.8         TPro  5.5<L>  /  Alb  4.0  /  TBili  0.6  /  DBili  x   /  AST  32  /  ALT  21  /  AlkPhos  40                                   9.7    15.61 )-----------( 201      ( 20 Aug 2020 13:23 )             27.9          PT/INR - ( 20 Aug 2020 13:23 )   PT: 16.6 sec;   INR: 1.46 ratio         PTT - ( 20 Aug 2020 13:23 )  PTT:28.5 sec  Bilirubin Total, Serum: 0.6 mg/dL ( @ 13:23)  Bilirubin Total, Serum: 0.6 mg/dL ( @ 07:01)    ABG - ( 20 Aug 2020 17:06 )  pH, Arterial: 7.39  pH, Blood: x     /  pCO2: 38    /  pO2: 108   / HCO3: 23    / Base Excess: -1.8  /  SaO2: 99                CARDIAC MARKERS ( 20 Aug 2020 13:23 )  x     / 0.60 ng/mL / 251 U/L / x     / 25.6 ng/mL      CAPILLARY BLOOD GLUCOSE      POCT Blood Glucose.: 137 mg/dL (21 Aug 2020 02:00)  POCT Blood Glucose.: 148 mg/dL (21 Aug 2020 00:05)  POCT Blood Glucose.: 135 mg/dL (20 Aug 2020 23:00)  POCT Blood Glucose.: 135 mg/dL (20 Aug 2020 22:04)  POCT Blood Glucose.: 149 mg/dL (20 Aug 2020 21:09)  POCT Blood Glucose.: 151 mg/dL (20 Aug 2020 20:01)  POCT Blood Glucose.: 159 mg/dL (20 Aug 2020 18:47)  POCT Blood Glucose.: 163 mg/dL (20 Aug 2020 18:00)  POCT Blood Glucose.: 160 mg/dL (20 Aug 2020 16:32)  POCT Blood Glucose.: 143 mg/dL (20 Aug 2020 15:30)  POCT Blood Glucose.: 131 mg/dL (20 Aug 2020 14:17)  POCT Blood Glucose.: 134 mg/dL (20 Aug 2020 13:18)             PHYSICAL EXAM:  General:  well nourished, no acute distress  Neurology:  alert and oriented X 4, nonfocal, no gross deficits  Respiratory:  clear to auscultation bilaterally  CV:  regular rate and rhythm, normal S1 S2  Abdomen:  soft, nontender, nondistended, positive bowel sounds  Extremities:  warm, well perfused, no edema +DP pulses  Incisions:  midline sternal incision, c/d/i, sternum stable

## 2020-08-21 NOTE — PHYSICAL THERAPY INITIAL EVALUATION ADULT - GENERAL OBSERVATIONS, REHAB EVAL
Pt. OOB; +monitor, O2 nasal canula, Right IJ central, radial a-line, chest tubes, mancini, pacer box

## 2020-08-22 LAB
ANION GAP SERPL CALC-SCNC: 14 MMOL/L — SIGNIFICANT CHANGE UP (ref 5–17)
BUN SERPL-MCNC: 18 MG/DL — SIGNIFICANT CHANGE UP (ref 8–20)
CALCIUM SERPL-MCNC: 8.7 MG/DL — SIGNIFICANT CHANGE UP (ref 8.6–10.2)
CHLORIDE SERPL-SCNC: 104 MMOL/L — SIGNIFICANT CHANGE UP (ref 98–107)
CO2 SERPL-SCNC: 22 MMOL/L — SIGNIFICANT CHANGE UP (ref 22–29)
CREAT SERPL-MCNC: 0.98 MG/DL — SIGNIFICANT CHANGE UP (ref 0.5–1.3)
GLUCOSE BLDC GLUCOMTR-MCNC: 157 MG/DL — HIGH (ref 70–99)
GLUCOSE BLDC GLUCOMTR-MCNC: 181 MG/DL — HIGH (ref 70–99)
GLUCOSE BLDC GLUCOMTR-MCNC: 194 MG/DL — HIGH (ref 70–99)
GLUCOSE BLDC GLUCOMTR-MCNC: 210 MG/DL — HIGH (ref 70–99)
GLUCOSE SERPL-MCNC: 143 MG/DL — HIGH (ref 70–99)
HCT VFR BLD CALC: 29.2 % — LOW (ref 39–50)
HGB BLD-MCNC: 9.5 G/DL — LOW (ref 13–17)
INR BLD: 1.3 RATIO — HIGH (ref 0.88–1.16)
MAGNESIUM SERPL-MCNC: 2.3 MG/DL — SIGNIFICANT CHANGE UP (ref 1.6–2.6)
MCHC RBC-ENTMCNC: 27.9 PG — SIGNIFICANT CHANGE UP (ref 27–34)
MCHC RBC-ENTMCNC: 32.5 GM/DL — SIGNIFICANT CHANGE UP (ref 32–36)
MCV RBC AUTO: 85.6 FL — SIGNIFICANT CHANGE UP (ref 80–100)
PLATELET # BLD AUTO: 226 K/UL — SIGNIFICANT CHANGE UP (ref 150–400)
POTASSIUM SERPL-MCNC: 4.2 MMOL/L — SIGNIFICANT CHANGE UP (ref 3.5–5.3)
POTASSIUM SERPL-SCNC: 4.2 MMOL/L — SIGNIFICANT CHANGE UP (ref 3.5–5.3)
PROTHROM AB SERPL-ACNC: 14.9 SEC — HIGH (ref 10.6–13.6)
RBC # BLD: 3.41 M/UL — LOW (ref 4.2–5.8)
RBC # FLD: 13.8 % — SIGNIFICANT CHANGE UP (ref 10.3–14.5)
SODIUM SERPL-SCNC: 140 MMOL/L — SIGNIFICANT CHANGE UP (ref 135–145)
WBC # BLD: 16.42 K/UL — HIGH (ref 3.8–10.5)
WBC # FLD AUTO: 16.42 K/UL — HIGH (ref 3.8–10.5)

## 2020-08-22 PROCEDURE — 71045 X-RAY EXAM CHEST 1 VIEW: CPT | Mod: 26,76

## 2020-08-22 PROCEDURE — 93010 ELECTROCARDIOGRAM REPORT: CPT

## 2020-08-22 RX ORDER — INSULIN LISPRO 100/ML
4 VIAL (ML) SUBCUTANEOUS
Refills: 0 | Status: DISCONTINUED | OUTPATIENT
Start: 2020-08-22 | End: 2020-08-23

## 2020-08-22 RX ORDER — PANTOPRAZOLE SODIUM 20 MG/1
40 TABLET, DELAYED RELEASE ORAL
Refills: 0 | Status: DISCONTINUED | OUTPATIENT
Start: 2020-08-22 | End: 2020-08-25

## 2020-08-22 RX ORDER — SODIUM CHLORIDE 9 MG/ML
3 INJECTION INTRAMUSCULAR; INTRAVENOUS; SUBCUTANEOUS EVERY 8 HOURS
Refills: 0 | Status: DISCONTINUED | OUTPATIENT
Start: 2020-08-22 | End: 2020-08-25

## 2020-08-22 RX ORDER — METOPROLOL TARTRATE 50 MG
50 TABLET ORAL
Refills: 0 | Status: DISCONTINUED | OUTPATIENT
Start: 2020-08-22 | End: 2020-08-25

## 2020-08-22 RX ADMIN — Medication 100 MILLIGRAM(S): at 04:38

## 2020-08-22 RX ADMIN — Medication 100 MILLIGRAM(S): at 11:16

## 2020-08-22 RX ADMIN — CHLORHEXIDINE GLUCONATE 1 APPLICATION(S): 213 SOLUTION TOPICAL at 13:09

## 2020-08-22 RX ADMIN — SODIUM CHLORIDE 3 MILLILITER(S): 9 INJECTION INTRAMUSCULAR; INTRAVENOUS; SUBCUTANEOUS at 21:41

## 2020-08-22 RX ADMIN — OXYCODONE AND ACETAMINOPHEN 1 TABLET(S): 5; 325 TABLET ORAL at 10:39

## 2020-08-22 RX ADMIN — OXYCODONE AND ACETAMINOPHEN 1 TABLET(S): 5; 325 TABLET ORAL at 22:40

## 2020-08-22 RX ADMIN — Medication 4 UNIT(S): at 17:31

## 2020-08-22 RX ADMIN — Medication 81 MILLIGRAM(S): at 11:16

## 2020-08-22 RX ADMIN — PANTOPRAZOLE SODIUM 40 MILLIGRAM(S): 20 TABLET, DELAYED RELEASE ORAL at 09:39

## 2020-08-22 RX ADMIN — Medication 1: at 21:41

## 2020-08-22 RX ADMIN — WARFARIN SODIUM 2.5 MILLIGRAM(S): 2.5 TABLET ORAL at 21:41

## 2020-08-22 RX ADMIN — CLOPIDOGREL BISULFATE 75 MILLIGRAM(S): 75 TABLET, FILM COATED ORAL at 11:16

## 2020-08-22 RX ADMIN — Medication 2 UNIT(S): at 07:49

## 2020-08-22 RX ADMIN — OXYCODONE AND ACETAMINOPHEN 1 TABLET(S): 5; 325 TABLET ORAL at 21:40

## 2020-08-22 RX ADMIN — ENOXAPARIN SODIUM 40 MILLIGRAM(S): 100 INJECTION SUBCUTANEOUS at 11:16

## 2020-08-22 RX ADMIN — Medication 2 UNIT(S): at 11:15

## 2020-08-22 RX ADMIN — ATORVASTATIN CALCIUM 80 MILLIGRAM(S): 80 TABLET, FILM COATED ORAL at 21:41

## 2020-08-22 RX ADMIN — OXYCODONE AND ACETAMINOPHEN 2 TABLET(S): 5; 325 TABLET ORAL at 00:24

## 2020-08-22 RX ADMIN — OXYCODONE AND ACETAMINOPHEN 1 TABLET(S): 5; 325 TABLET ORAL at 15:19

## 2020-08-22 RX ADMIN — POLYETHYLENE GLYCOL 3350 17 GRAM(S): 17 POWDER, FOR SOLUTION ORAL at 11:16

## 2020-08-22 RX ADMIN — INSULIN GLARGINE 15 UNIT(S): 100 INJECTION, SOLUTION SUBCUTANEOUS at 21:40

## 2020-08-22 RX ADMIN — OXYCODONE AND ACETAMINOPHEN 1 TABLET(S): 5; 325 TABLET ORAL at 09:39

## 2020-08-22 RX ADMIN — Medication 250 MILLIGRAM(S): at 17:30

## 2020-08-22 RX ADMIN — Medication 1: at 11:15

## 2020-08-22 RX ADMIN — SODIUM CHLORIDE 3 MILLILITER(S): 9 INJECTION INTRAMUSCULAR; INTRAVENOUS; SUBCUTANEOUS at 13:09

## 2020-08-22 RX ADMIN — Medication 1: at 07:48

## 2020-08-22 RX ADMIN — SENNA PLUS 2 TABLET(S): 8.6 TABLET ORAL at 21:41

## 2020-08-22 RX ADMIN — Medication 250 MILLIGRAM(S): at 06:53

## 2020-08-22 RX ADMIN — Medication 5 MILLIGRAM(S): at 21:45

## 2020-08-22 RX ADMIN — Medication 50 MILLIGRAM(S): at 06:53

## 2020-08-22 RX ADMIN — Medication 50 MILLIGRAM(S): at 17:31

## 2020-08-22 RX ADMIN — Medication 2: at 17:25

## 2020-08-22 NOTE — PROGRESS NOTE ADULT - SUBJECTIVE AND OBJECTIVE BOX
Subjective:  55yo M resting comfortable, no c/o pain      HPI:  54 year old Male with PMH of DM type II, HLD transferred from [facility] s/p cardiac cath which reveals Triple vessel disease. Patient presented to [facility] [place] with sudden onset of chest pressure and tightness radiating to neck and left arm, onset during rest, improved after Aspirin 81 mg x4 by EMS. As per patient he noticed to have mild to moderate  activity intolerance over 1 year, worsened in past week to a level that he has to stop between minimal activities of daily living. Patient denies fever, chills, shortness of breath, dizziness, headache, abdominal pain, N/V/D. Patient denies lower extremity edema/pain, recent long distance travel or sick contact. Patient denies exposure to COVID 19 but his wife recently positive for COVID antibodies. Patient COVID PCR from [facility] [place] tested negative on 2020. (17 Aug 2020 21:14)          PAST MEDICAL & SURGICAL HISTORY:  Hyperlipidemia  Diabetes mellitus, type 2          MEDICATIONS  (STANDING):  aspirin  chewable 81 milliGRAM(s) Oral daily  atorvastatin 80 milliGRAM(s) Oral at bedtime  chlorhexidine 2% Cloths 1 Application(s) Topical daily  clopidogrel Tablet 75 milliGRAM(s) Oral daily  dextrose 5%. 1000 milliLiter(s) (50 mL/Hr) IV Continuous <Continuous>  dextrose 50% Injectable 50 milliLiter(s) IV Push every 15 minutes  dextrose 50% Injectable 25 milliLiter(s) IV Push every 15 minutes  enoxaparin Injectable 40 milliGRAM(s) SubCutaneous daily  insulin glargine Injectable (LANTUS) 15 Unit(s) SubCutaneous at bedtime  insulin lispro (HumaLOG) corrective regimen sliding scale   SubCutaneous Before meals and at bedtime  insulin lispro Injectable (HumaLOG) 4 Unit(s) SubCutaneous three times a day before meals  metoprolol tartrate 50 milliGRAM(s) Oral two times a day  pantoprazole    Tablet 40 milliGRAM(s) Oral before breakfast  polyethylene glycol 3350 17 Gram(s) Oral daily  senna 2 Tablet(s) Oral at bedtime  sodium chloride 0.9% lock flush 3 milliLiter(s) IV Push every 8 hours    MEDICATIONS  (PRN):  dextrose 40% Gel 15 Gram(s) Oral once PRN Blood Glucose LESS THAN 70 milliGRAM(s)/deciliter  glucagon  Injectable 1 milliGRAM(s) IntraMuscular once PRN Glucose LESS THAN 70 milligrams/deciliter  melatonin 5 milliGRAM(s) Oral at bedtime PRN Insomnia  oxycodone    5 mG/acetaminophen 325 mG 1 Tablet(s) Oral every 4 hours PRN Moderate Pain (4 - 6)  oxycodone    5 mG/acetaminophen 325 mG 2 Tablet(s) Oral every 4 hours PRN Severe Pain (7 - 10)          Allergies    No Known Allergies    Intolerances          WEIGHTS:  Daily     Daily Weight in k.2 (22 Aug 2020 08:45)  Admit Wt: Drug Dosing Weight  Height (cm): 177.8 (20 Aug 2020 07:25)  Weight (kg): 95.5 (20 Aug 2020 07:25)  BMI (kg/m2): 30.2 (20 Aug 2020 07:)  BSA (m2): 2.13 (20 Aug 2020 07:25)  I&O's Summary    21 Aug 2020 07:01  -  22 Aug 2020 07:00  --------------------------------------------------------  IN: 715 mL / OUT: 2370 mL / NET: -1655 mL    22 Aug 2020 07:01  -  23 Aug 2020 03:01  --------------------------------------------------------  IN: 100 mL / OUT: 900 mL / NET: -800 mL        VITAL SIGNS:  ICU Vital Signs Last 24 Hrs  T(C): 36.9 (22 Aug 2020 21:37), Max: 37.3 (22 Aug 2020 04:00)  T(F): 98.5 (22 Aug 2020 21:37), Max: 99.1 (22 Aug 2020 04:00)  HR: 100 (22 Aug 2020 21:37) (89 - 109)  BP: 116/79 (22 Aug 2020 21:37) (110/77 - 132/80)  BP(mean): 88 (22 Aug 2020 17:28) (85 - 105)  ABP: 123/68 (22 Aug 2020 05:00) (123/68 - 130/66)  ABP(mean): 86 (22 Aug 2020 05:00) (86 - 87)  RR: 18 (22 Aug 2020 21:37) (14 - 23)  SpO2: 93% (22 Aug 2020 21:37) (91% - 98%)        All laboratory results, radiology and medications reviewed.    LABS:      140  |  104  |  18.0  ----------------------------<  143<H>  4.2   |  22.0  |  0.98    Ca    8.7      22 Aug 2020 02:21  Mg     2.3         TPro  5.9<L>  /  Alb  4.2  /  TBili  0.5  /  DBili  x   /  AST  47<H>  /  ALT  23  /  AlkPhos  44                                   9.5    16.42 )-----------( 226      ( 22 Aug 2020 02:21 )             29.2          PT/INR - ( 22 Aug 2020 02:21 )   PT: 14.9 sec;   INR: 1.30 ratio         PTT - ( 21 Aug 2020 03:11 )  PTT:28.6 sec      CARDIAC MARKERS ( 21 Aug 2020 03:11 )  x     / 0.57 ng/mL / 447 U/L / x     / 26.1 ng/mL      CAPILLARY BLOOD GLUCOSE      POCT Blood Glucose.: 181 mg/dL (22 Aug 2020 20:43)  POCT Blood Glucose.: 210 mg/dL (22 Aug 2020 16:51)  POCT Blood Glucose.: 194 mg/dL (22 Aug 2020 11:13)  POCT Blood Glucose.: 157 mg/dL (22 Aug 2020 07:46)             PHYSICAL EXAM:  General:  well nourished, no acute distress  Neurology:  alert and oriented X 4, nonfocal, no gross deficits  Respiratory:  clear to auscultation bilaterally  CV:  regular rate and rhythm, normal S1 S2  Abdomen:  soft, nontender, nondistended, positive bowel sounds  Extremities:  warm, well perfused, 1+ edema +DP pulses  Incisions:  midline sternal incision, c/d/i, sternum stable

## 2020-08-22 NOTE — DIETITIAN INITIAL EVALUATION ADULT. - PERTINENT LABORATORY DATA
08-22 Na140 mmol/L Glu 143 mg/dL<H> K+ 4.2 mmol/L Cr  0.98 mg/dL BUN 18.0 mg/dL Phos n/a   Alb n/a   PAB n/a

## 2020-08-22 NOTE — DIETITIAN INITIAL EVALUATION ADULT. - OTHER INFO
54 year old Male with PMH of DM type II, HLD transferred from [facility] s/p cardiac cath which reveals Triple vessel disease. [place] [facility] [place] of chest pressure and tightness radiating to neck and left arm, onset during rest, improved after Aspirin 81 mg x4 by EMS. As per patient he noticed to have mild to moderate  activity intolerance over 1 year, worsened in past week to a level that he has to stop between minimal activities of daily living. Patient COVID PCR from [facility] tested negative on 8/17/2020. On 8/20 pt underwent C3L with LAD endarterectomy. Pt reported good po intake PTA and currently. -204# in June. Pt educated on heart healthy and diabetic diet. HgbA1c 6.9% noted. Last documented BM 8/18.

## 2020-08-22 NOTE — DIETITIAN INITIAL EVALUATION ADULT. - PROBLEM SELECTOR PLAN 1
Admit under Dr. Plasencia  Pre op work up started  ECHO, Carotid, PFT Pending  COVID antibody/PCR pending (PCR negative from [facility] 8/17)  Lopressor 25 mg BID  Lipitor 40 mg   DASH/ Const. Carb diet  Insulin coverage as per sliding scale, (AIC 6.9), will consider Lantus, pre- meal in AM  Endo consult in AM

## 2020-08-23 ENCOUNTER — TRANSCRIPTION ENCOUNTER (OUTPATIENT)
Age: 55
End: 2020-08-23

## 2020-08-23 LAB
ANION GAP SERPL CALC-SCNC: 12 MMOL/L — SIGNIFICANT CHANGE UP (ref 5–17)
BUN SERPL-MCNC: 18 MG/DL — SIGNIFICANT CHANGE UP (ref 8–20)
CALCIUM SERPL-MCNC: 8.8 MG/DL — SIGNIFICANT CHANGE UP (ref 8.6–10.2)
CHLORIDE SERPL-SCNC: 101 MMOL/L — SIGNIFICANT CHANGE UP (ref 98–107)
CO2 SERPL-SCNC: 27 MMOL/L — SIGNIFICANT CHANGE UP (ref 22–29)
CREAT SERPL-MCNC: 0.85 MG/DL — SIGNIFICANT CHANGE UP (ref 0.5–1.3)
GLUCOSE BLDC GLUCOMTR-MCNC: 137 MG/DL — HIGH (ref 70–99)
GLUCOSE BLDC GLUCOMTR-MCNC: 157 MG/DL — HIGH (ref 70–99)
GLUCOSE BLDC GLUCOMTR-MCNC: 161 MG/DL — HIGH (ref 70–99)
GLUCOSE BLDC GLUCOMTR-MCNC: 175 MG/DL — HIGH (ref 70–99)
GLUCOSE SERPL-MCNC: 139 MG/DL — HIGH (ref 70–99)
HCT VFR BLD CALC: 30.4 % — LOW (ref 39–50)
HGB BLD-MCNC: 10 G/DL — LOW (ref 13–17)
INR BLD: 1.16 RATIO — SIGNIFICANT CHANGE UP (ref 0.88–1.16)
MAGNESIUM SERPL-MCNC: 2.2 MG/DL — SIGNIFICANT CHANGE UP (ref 1.8–2.6)
MCHC RBC-ENTMCNC: 28.1 PG — SIGNIFICANT CHANGE UP (ref 27–34)
MCHC RBC-ENTMCNC: 32.9 GM/DL — SIGNIFICANT CHANGE UP (ref 32–36)
MCV RBC AUTO: 85.4 FL — SIGNIFICANT CHANGE UP (ref 80–100)
PLATELET # BLD AUTO: 240 K/UL — SIGNIFICANT CHANGE UP (ref 150–400)
POTASSIUM SERPL-MCNC: 3.8 MMOL/L — SIGNIFICANT CHANGE UP (ref 3.5–5.3)
POTASSIUM SERPL-SCNC: 3.8 MMOL/L — SIGNIFICANT CHANGE UP (ref 3.5–5.3)
PROTHROM AB SERPL-ACNC: 13.4 SEC — SIGNIFICANT CHANGE UP (ref 10.6–13.6)
RBC # BLD: 3.56 M/UL — LOW (ref 4.2–5.8)
RBC # FLD: 13.6 % — SIGNIFICANT CHANGE UP (ref 10.3–14.5)
SODIUM SERPL-SCNC: 140 MMOL/L — SIGNIFICANT CHANGE UP (ref 135–145)
WBC # BLD: 12.32 K/UL — HIGH (ref 3.8–10.5)
WBC # FLD AUTO: 12.32 K/UL — HIGH (ref 3.8–10.5)

## 2020-08-23 PROCEDURE — 99254 IP/OBS CNSLTJ NEW/EST MOD 60: CPT

## 2020-08-23 PROCEDURE — 71045 X-RAY EXAM CHEST 1 VIEW: CPT | Mod: 26

## 2020-08-23 RX ORDER — POTASSIUM CHLORIDE 20 MEQ
40 PACKET (EA) ORAL EVERY 4 HOURS
Refills: 0 | Status: COMPLETED | OUTPATIENT
Start: 2020-08-23 | End: 2020-08-23

## 2020-08-23 RX ORDER — FUROSEMIDE 40 MG
20 TABLET ORAL DAILY
Refills: 0 | Status: DISCONTINUED | OUTPATIENT
Start: 2020-08-23 | End: 2020-08-25

## 2020-08-23 RX ORDER — WARFARIN SODIUM 2.5 MG/1
2.5 TABLET ORAL ONCE
Refills: 0 | Status: COMPLETED | OUTPATIENT
Start: 2020-08-23 | End: 2020-08-23

## 2020-08-23 RX ORDER — METFORMIN HYDROCHLORIDE 850 MG/1
1000 TABLET ORAL
Refills: 0 | Status: DISCONTINUED | OUTPATIENT
Start: 2020-08-24 | End: 2020-08-25

## 2020-08-23 RX ORDER — INSULIN GLARGINE 100 [IU]/ML
7 INJECTION, SOLUTION SUBCUTANEOUS AT BEDTIME
Refills: 0 | Status: COMPLETED | OUTPATIENT
Start: 2020-08-23 | End: 2020-08-23

## 2020-08-23 RX ORDER — METFORMIN HYDROCHLORIDE 850 MG/1
500 TABLET ORAL
Refills: 0 | Status: COMPLETED | OUTPATIENT
Start: 2020-08-23 | End: 2020-08-24

## 2020-08-23 RX ADMIN — SENNA PLUS 2 TABLET(S): 8.6 TABLET ORAL at 21:53

## 2020-08-23 RX ADMIN — SODIUM CHLORIDE 3 MILLILITER(S): 9 INJECTION INTRAMUSCULAR; INTRAVENOUS; SUBCUTANEOUS at 05:17

## 2020-08-23 RX ADMIN — SODIUM CHLORIDE 3 MILLILITER(S): 9 INJECTION INTRAMUSCULAR; INTRAVENOUS; SUBCUTANEOUS at 21:57

## 2020-08-23 RX ADMIN — WARFARIN SODIUM 2.5 MILLIGRAM(S): 2.5 TABLET ORAL at 21:53

## 2020-08-23 RX ADMIN — Medication 1: at 09:04

## 2020-08-23 RX ADMIN — METFORMIN HYDROCHLORIDE 500 MILLIGRAM(S): 850 TABLET ORAL at 11:25

## 2020-08-23 RX ADMIN — INSULIN GLARGINE 7 UNIT(S): 100 INJECTION, SOLUTION SUBCUTANEOUS at 21:52

## 2020-08-23 RX ADMIN — Medication 50 MILLIGRAM(S): at 06:01

## 2020-08-23 RX ADMIN — ATORVASTATIN CALCIUM 80 MILLIGRAM(S): 80 TABLET, FILM COATED ORAL at 21:52

## 2020-08-23 RX ADMIN — ENOXAPARIN SODIUM 40 MILLIGRAM(S): 100 INJECTION SUBCUTANEOUS at 11:25

## 2020-08-23 RX ADMIN — CHLORHEXIDINE GLUCONATE 1 APPLICATION(S): 213 SOLUTION TOPICAL at 09:54

## 2020-08-23 RX ADMIN — Medication 40 MILLIEQUIVALENT(S): at 09:54

## 2020-08-23 RX ADMIN — Medication 40 MILLIEQUIVALENT(S): at 14:11

## 2020-08-23 RX ADMIN — Medication 20 MILLIGRAM(S): at 17:10

## 2020-08-23 RX ADMIN — Medication 81 MILLIGRAM(S): at 09:54

## 2020-08-23 RX ADMIN — METFORMIN HYDROCHLORIDE 500 MILLIGRAM(S): 850 TABLET ORAL at 17:11

## 2020-08-23 RX ADMIN — Medication 1: at 21:52

## 2020-08-23 RX ADMIN — OXYCODONE AND ACETAMINOPHEN 1 TABLET(S): 5; 325 TABLET ORAL at 06:00

## 2020-08-23 RX ADMIN — OXYCODONE AND ACETAMINOPHEN 1 TABLET(S): 5; 325 TABLET ORAL at 22:00

## 2020-08-23 RX ADMIN — POLYETHYLENE GLYCOL 3350 17 GRAM(S): 17 POWDER, FOR SOLUTION ORAL at 09:53

## 2020-08-23 RX ADMIN — PANTOPRAZOLE SODIUM 40 MILLIGRAM(S): 20 TABLET, DELAYED RELEASE ORAL at 06:01

## 2020-08-23 RX ADMIN — Medication 1: at 12:48

## 2020-08-23 RX ADMIN — CLOPIDOGREL BISULFATE 75 MILLIGRAM(S): 75 TABLET, FILM COATED ORAL at 09:54

## 2020-08-23 RX ADMIN — Medication 50 MILLIGRAM(S): at 17:11

## 2020-08-23 RX ADMIN — Medication 5 MILLIGRAM(S): at 21:57

## 2020-08-23 RX ADMIN — SODIUM CHLORIDE 3 MILLILITER(S): 9 INJECTION INTRAMUSCULAR; INTRAVENOUS; SUBCUTANEOUS at 14:12

## 2020-08-23 RX ADMIN — Medication 4 UNIT(S): at 09:04

## 2020-08-23 RX ADMIN — OXYCODONE AND ACETAMINOPHEN 1 TABLET(S): 5; 325 TABLET ORAL at 18:49

## 2020-08-23 RX ADMIN — OXYCODONE AND ACETAMINOPHEN 1 TABLET(S): 5; 325 TABLET ORAL at 17:16

## 2020-08-23 NOTE — CHART NOTE - NSCHARTNOTEFT_GEN_A_CORE
CTS ACP Addendum    Briefly, 54 year old Male with PMH of DM type II (HA1c 6.9 on metformin), HLD, transferred from [facility] s/p cardiac cath which revealed mutivessel coronary artery disease.  On 8/20 pt underwent CABG x 3 (LIMA-LAD, SVG-OM, SVG-RPDA) with Coronary Endarterectomy (LAD-Vein Patch Angioplasty) on 8/20/20 with Dr. Plasencia.  Postoperative course remains uneventful.    Patient seen and examined. Notes, flowsheets, medications, radiologic images and labs reviewed. Patient reports continued left hand numbness of 4th and 5th digits with perceived pressure over medial anterior wrist. Denies weakness or pain. Glucoses relatively controlled. Mild to moderate right LE edema    Plan:  - Transition to Metformin  - Endocrine consult called and pending for Monday  - Potassium supplemented for hypokalemia  - Coumadin dosing for endarterectomy  - Continue Aspirin and Plavix for CABG  - Will discuss with Erinn about discharge on two or three anti-plt/AC agents  - Lopressor for beta blockade  - OOB, ambulate  - Isolate PW today  - Net negative 1300 mL yesterday but will start low dose diuretic for persistent LE edema  - Plan for wires out tomorrow and discharge home Tuesday    Case discussed with Dr. Plasencia CTS ACP Addendum    Briefly, 54 year old Male with PMH of DM type II (HA1c 6.9 on metformin), HLD, transferred from [facility] s/p cardiac cath which revealed mutivessel coronary artery disease.  On 8/20 pt underwent CABG x 3 (LIMA-LAD, SVG-OM, SVG-RPDA) with Coronary Endarterectomy (LAD-Vein Patch Angioplasty) on 8/20/20 with Dr. Plasencia.  Postoperative course remains uneventful.    Patient seen and examined. Notes, flowsheets, medications, radiologic images and labs reviewed. Patient reports continued left hand numbness of 4th and 5th digits with perceived pressure over medial anterior wrist. Denies weakness or pain. Glucoses relatively controlled. Mild to moderate right LE edema    Plan:  - Transition to Metformin  - Endocrine consult called and pending for Monday  - Potassium supplemented for hypokalemia  - Coumadin dosing for endarterectomy, goal INR 1.6-1.9  - Continue Aspirin and Plavix for CABG  - Will discuss with Erinn about discharge on two or three anti-plt/AC agents  - Lopressor for beta blockade  - OOB, ambulate  - Isolate PW today  - Net negative 1300 mL yesterday but will start low dose diuretic for persistent LE edema  - Plan for wires out tomorrow and discharge home Tuesday    Case discussed with Dr. Plasencia

## 2020-08-23 NOTE — PROGRESS NOTE ADULT - SUBJECTIVE AND OBJECTIVE BOX
POD # 3 s/p CABG x 3 (LIMA-LAD, SVG-OM, SVG-RPDA), Coronary Endarterectomy (LAD-Vein Patch Angioplasty)    Patient is a 54y old  Male who presents with a chief complaint of CAD (21 Aug 2020 02:56)    HPI:  54 year old Male with PMH of DM type II, HLD transferred from [facility] s/p cardiac cath which reveals Triple vessel disease. Patient [place] to [facility] [place] with sudden onset of chest pressure and tightness radiating to neck and left arm, onset during rest, improved after Aspirin 81 mg x4 by EMS. As per patient he noticed to have mild to moderate  activity intolerance over 1 year, worsened in past week to a level that he has to stop between minimal activities of daily living. Patient denies fever, chills, shortness of breath, dizziness, headache, abdominal pain, N/V/D. Patient denies lower extremity edema/pain, recent long distance travel or sick contact. Patient denies exposure to COVID 19 but his wife recently positive for COVID antibodies. Patient COVID PCR from [facility] [place] tested negative on 8/17/2020. (17 Aug 2020 21:14)    PAST MEDICAL & SURGICAL HISTORY:  Hyperlipidemia  Diabetes mellitus, type 2    FAMILY HISTORY:  Family history of diabetes mellitus in grandfather    Brief Hospital Course: On 8/20 pt underwent CABG x 3 (LIMA-LAD, SVG-OM, SVG-RPDA) with Coronary Endarterectomy (LAD-Vein Patch Angioplasty) on 8/20/20 with Dr. Plasencia.  Postoperative course remains uneventful.    Significant recent/past 24 hr events: Chest tubes were removed and patient was downgraded to the floor 8/22.     Subjective: Patient sitting up in bed in no acute distress. +Limited appetite. +Tolerating diet, no nausea/vomiting or abdominal pain noted. +Passing flatus, no BM since surgery yet. +Pain controlled. Denies fevers, chills, lightheadedness, dizziness, HA, CP, palpitations, cough, abdominal pain, N/V, diarrhea, numbness/tingling in extremities, or any other acute complaints. ROS negative x 10 systems except as noted above.    MEDICATIONS  (STANDING):  aspirin  chewable 81 milliGRAM(s) Oral daily  atorvastatin 80 milliGRAM(s) Oral at bedtime  chlorhexidine 2% Cloths 1 Application(s) Topical daily  clopidogrel Tablet 75 milliGRAM(s) Oral daily  enoxaparin Injectable 40 milliGRAM(s) SubCutaneous daily  insulin glargine Injectable (LANTUS) 15 Unit(s) SubCutaneous at bedtime  insulin lispro (HumaLOG) corrective regimen sliding scale   SubCutaneous Before meals and at bedtime  insulin lispro Injectable (HumaLOG) 4 Unit(s) SubCutaneous three times a day before meals  metoprolol tartrate 50 milliGRAM(s) Oral two times a day  pantoprazole    Tablet 40 milliGRAM(s) Oral before breakfast  polyethylene glycol 3350 17 Gram(s) Oral daily  senna 2 Tablet(s) Oral at bedtime  sodium chloride 0.9% lock flush 3 milliLiter(s) IV Push every 8 hours    MEDICATIONS  (PRN):  dextrose 40% Gel 15 Gram(s) Oral once PRN Blood Glucose LESS THAN 70 milliGRAM(s)/deciliter  glucagon  Injectable 1 milliGRAM(s) IntraMuscular once PRN Glucose LESS THAN 70 milligrams/deciliter  melatonin 5 milliGRAM(s) Oral at bedtime PRN Insomnia  oxycodone    5 mG/acetaminophen 325 mG 1 Tablet(s) Oral every 4 hours PRN Moderate Pain (4 - 6)  oxycodone    5 mG/acetaminophen 325 mG 2 Tablet(s) Oral every 4 hours PRN Severe Pain (7 - 10)    Allergies: No Known Allergies    Vitals   T(C): 36.9 (22 Aug 2020 21:37), Max: 37.3 (22 Aug 2020 04:00)  T(F): 98.5 (22 Aug 2020 21:37), Max: 99.1 (22 Aug 2020 04:00)  HR: 100 (22 Aug 2020 21:37) (89 - 109)  BP: 116/79 (22 Aug 2020 21:37) (110/77 - 132/80)  BP(mean): 88 (22 Aug 2020 17:28) (85 - 105)  ABP: 123/68 (22 Aug 2020 05:00) (123/68 - 139/68)  ABP(mean): 86 (22 Aug 2020 05:00) (86 - 90)  RR: 18 (22 Aug 2020 21:37) (14 - 23)  SpO2: 93% (22 Aug 2020 21:37) (91% - 98%)    I&O's Detail    21 Aug 2020 07:01  -  22 Aug 2020 07:00  --------------------------------------------------------  IN:    insulin regular Infusion: 45 mL    sodium chloride 0.9%: 160 mL    sodium chloride 0.9%: 110 mL    Solution: 100 mL    Solution: 250 mL    Solution: 50 mL  Total IN: 715 mL    OUT:    Chest Tube: 20 mL    Chest Tube: 275 mL    Indwelling Catheter - Urethral: 2075 mL  Total OUT: 2370 mL    Total NET: -1655 mL      22 Aug 2020 07:01  -  23 Aug 2020 02:58  --------------------------------------------------------  IN:    Oral Fluid: 100 mL  Total IN: 100 mL    OUT:    Voided: 900 mL  Total OUT: 900 mL    Total NET: -800 mL    Physical Exam  Neuro: A+O x 3, non-focal, speech clear and intact  HEENT:  NCAT, PERRL, EOMI. No conjuctival edema or icterus, no thrush.    Neck:  Supple, trachea midline, right neck dressing c/d/i  Pulm: Diminished BSs at bases b/l, equal bilaterally, no rales/rhonchi/wheezing, no accessory muscle use noted  Chest:        +PW (settings: VVI, rate: 50, mA: 10, sensitivity: 0.8)  CV: regular rate, regular rhythm, +S1S2, no murmur or rub noted  Abd: soft, NT, ND, + BS  Ext: MUNOZ x 4, scant LE edema b/l, no cyanosis or clubbing, distal motor/neuro/circ intact  Skin: warm, dry, well perfused  Incisions: midsternal incision with +mepilex dressing c/d/i, sternum stable, no click appreciated. RLE C/D/I w/ dressing and Ace wrap. Dressings over previous chest tube sites c/d/i.     LABS                        9.5    16.42 )-----------( 226      ( 22 Aug 2020 02:21 )             29.2     08-22    140  |  104  |  18.0  ----------------------------<  143<H>  4.2   |  22.0  |  0.98    Ca    8.7      22 Aug 2020 02:21  Mg     2.3     08-22    TPro  5.9<L>  /  Alb  4.2  /  TBili  0.5  /  DBili  x   /  AST  47<H>  /  ALT  23  /  AlkPhos  44  08-21    PT/INR - ( 22 Aug 2020 02:21 )   PT: 14.9 sec;   INR: 1.30 ratio      PTT - ( 21 Aug 2020 03:11 )  PTT:28.6 sec    POCT Blood Glucose.: 181 mg/dL (08-22-20 @ 20:43)  POCT Blood Glucose.: 210 mg/dL (08-22-20 @ 16:51)  POCT Blood Glucose.: 194 mg/dL (08-22-20 @ 11:13)  POCT Blood Glucose.: 157 mg/dL (08-22-20 @ 07:46)    Last CXR:  < from: Xray Chest 1 View- PORTABLE-Urgent (08.22.20 @ 09:06) >  FINDINGS:   20/2/2020 chest radiograph available for review.  The lungs are clear gross airspace consolidations or effusions. No pneumothorax. There is LEFT lung base linear residual linear atelectasis.  The heart and mediastinum are within normal limits following cardiac surgery.  Visualized osseous structures are intact.  IMPRESSION:  Status post open heart surgery.  No evidence of active chest pathology. Residual LEFT lung base linear atelectasis.  < end of copied text >

## 2020-08-23 NOTE — CONSULT NOTE ADULT - SUBJECTIVE AND OBJECTIVE BOX
HPI:  54 year old Male with PMH of DM type II, HLD transferred from [facility] s/p cardiac cath which reveals Triple vessel disease. Patient presented to [facility] [place] with sudden onset of chest pressure and tightness radiating to neck and left arm, onset during rest, improved after Aspirin 81 mg x4 by EMS. As per patient he noticed to have mild to moderate  activity intolerance over 1 year, worsened in past week to a level that he has to stop between minimal activities of daily living. Patient denies exposure to COVID 19 but his wife recently positive for COVID antibodies. Patient COVID PCR from [facility] [place] tested negative on 8/17/2020. (17 Aug 2020 21:14)  On 8/20/20 he underwent CABG w coronary endarterectomy.    T2DM x5 years and has been on/off MFN for past 5 years and admits to missing evening doses.  His wife has T1DM so they follow a diabetic diet but admits that there is "room for improvement"  He lives in [facility] but has been visiting relative in [place] since pandemic started.  He follows w his PCP regularly but will follow up with his wife's endocrinologist.  He tests his FS about 1x daily, fasting, and reports sugars 140-160's.  He denies diabetic microvascular disease.     PAST MEDICAL & SURGICAL HISTORY:  Hyperlipidemia  Diabetes mellitus, type 2    FAMILY HISTORY:  Family history of diabetes mellitus in grandfather  Father (+) CAD w stents    SOCIAL HISTORY: denies tobacco, illicit drugs or EtOh use    MEDICATIONS  (STANDING):  aspirin  chewable 81 milliGRAM(s) Oral daily  atorvastatin 80 milliGRAM(s) Oral at bedtime  chlorhexidine 2% Cloths 1 Application(s) Topical daily  clopidogrel Tablet 75 milliGRAM(s) Oral daily  dextrose 5%. 1000 milliLiter(s) (50 mL/Hr) IV Continuous <Continuous>  dextrose 50% Injectable 50 milliLiter(s) IV Push every 15 minutes  dextrose 50% Injectable 25 milliLiter(s) IV Push every 15 minutes  enoxaparin Injectable 40 milliGRAM(s) SubCutaneous daily  furosemide    Tablet 20 milliGRAM(s) Oral daily  insulin glargine Injectable (LANTUS) 7 Unit(s) SubCutaneous at bedtime  insulin lispro (HumaLOG) corrective regimen sliding scale   SubCutaneous Before meals and at bedtime  metFORMIN 500 milliGRAM(s) Oral two times a day with meals  metoprolol tartrate 50 milliGRAM(s) Oral two times a day  pantoprazole    Tablet 40 milliGRAM(s) Oral before breakfast  polyethylene glycol 3350 17 Gram(s) Oral daily  senna 2 Tablet(s) Oral at bedtime  sodium chloride 0.9% lock flush 3 milliLiter(s) IV Push every 8 hours  warfarin 2.5 milliGRAM(s) Oral once    MEDICATIONS  (PRN):  dextrose 40% Gel 15 Gram(s) Oral once PRN Blood Glucose LESS THAN 70 milliGRAM(s)/deciliter  glucagon  Injectable 1 milliGRAM(s) IntraMuscular once PRN Glucose LESS THAN 70 milligrams/deciliter  melatonin 5 milliGRAM(s) Oral at bedtime PRN Insomnia  oxycodone    5 mG/acetaminophen 325 mG 1 Tablet(s) Oral every 4 hours PRN Moderate Pain (4 - 6)  oxycodone    5 mG/acetaminophen 325 mG 2 Tablet(s) Oral every 4 hours PRN Severe Pain (7 - 10)      ALLERGIES: No Known Allergies      REVIEW OF SYSTEMS:  CONSTITUTIONAL: No fever, weight loss, or fatigue  EYES: No blurry vision  RESPIRATORY: No cough, No shortness of breath  CARDIOVASCULAR: No chest pain  GASTROINTESTINAL: No abdominal or epigastric pain. No nausea or vomiting, (+)constipation  GENITOURINARY: No dysuria  NEUROLOGICAL: No headaches  PSYCHIATRIC: No depression or anxiety    Vital Signs Last 24 Hrs  T(C): 36.7 (23 Aug 2020 17:03), Max: 37.4 (23 Aug 2020 15:55)  T(F): 98.1 (23 Aug 2020 17:03), Max: 99.4 (23 Aug 2020 15:55)  HR: 100 (23 Aug 2020 17:03) (78 - 105)  BP: 114/77 (23 Aug 2020 17:03) (114/75 - 127/85)  BP(mean): 89 (23 Aug 2020 17:03) (88 - 94)  RR: 19 (23 Aug 2020 17:03) (18 - 19)  SpO2: 97% (23 Aug 2020 17:03) (93% - 98%)    Physical Exam:  General appearance: Well developed, well nourished.  Eyes: Pupils equal. EOMI  Lungs: Normal respiratory excursion. Lungs clear no w/r/r  CV: Normal S1S2, regular. No m/r/g.  Pedal pulses intact.  Abdomen: Soft, nontender, nondistended, (+) BS  Musculoskeletal: No cyanosis, clubbing, or edema.  Skin: Warm and moist.. Feet - no ulcers  Neuro: Cranial nerves intact. Good sensation to light touch.   Psych: Normal affect, good judgement/insight      LABS:                        10.0   12.32 )-----------( 240      ( 23 Aug 2020 07:07 )             30.4     08-23    140  |  101  |  18.0  ----------------------------<  139<H>  3.8   |  27.0  |  0.85    Ca    8.8      23 Aug 2020 07:07  Mg     2.2     08-23          A1C with Estimated Average Glucose Result: 6.9 % (08-17-20 @ 20:16)      CAPILLARY BLOOD GLUCOSE  POCT Blood Glucose.: 137 mg/dL (23 Aug 2020 17:12)  POCT Blood Glucose.: 175 mg/dL (23 Aug 2020 11:59)  POCT Blood Glucose.: 161 mg/dL (23 Aug 2020 08:05)  POCT Blood Glucose.: 181 mg/dL (22 Aug 2020 20:43)  POCT Blood Glucose.: 210 mg/dL (22 Aug 2020 16:51)  POCT Blood Glucose.: 194 mg/dL (22 Aug 2020 11:13)  POCT Blood Glucose.: 157 mg/dL (22 Aug 2020 07:46)  POCT Blood Glucose.: 136 mg/dL (21 Aug 2020 23:22)  POCT Blood Glucose.: 139 mg/dL (21 Aug 2020 21:14)  POCT Blood Glucose.: 146 mg/dL (21 Aug 2020 20:09)  POCT Blood Glucose.: 150 mg/dL (21 Aug 2020 19:00)  POCT Blood Glucose.: 162 mg/dL (21 Aug 2020 18:09)      Thyroid Stimulating Hormone, Serum: 4.58 uIU/mL [0.27 - 4.20] (08-17-20)

## 2020-08-23 NOTE — CONSULT NOTE ADULT - ASSESSMENT
54 year old Male with PMH of DM type II (HA1c 6.9 on metformin), HLD, transferred from [facility] s/p cardiac cath which revealed mutivessel coronary artery disease.  On 8/20 pt underwent CABG x 3with Coronary Endarterectomy   1. T2DM - A1c 6.9%, glucoses improving since surgery  - continue Metformin and low dose Lantus  - counseled pt on important of good sugar control chele post op, counseled pt on adherence with BID dosing of MFN  - anticipate that pt can go home on MFN therapy alone  - cont FS checks  - pt planning on following up w maureen in [facility]    2. HLD   - cont statin    3. CAD s/p CABG Coronary Endarterectomy   - management per CTS  - cont aspirin, statin, plavix and metoprolol

## 2020-08-24 ENCOUNTER — TRANSCRIPTION ENCOUNTER (OUTPATIENT)
Age: 55
End: 2020-08-24

## 2020-08-24 PROBLEM — E11.9 TYPE 2 DIABETES MELLITUS WITHOUT COMPLICATIONS: Chronic | Status: ACTIVE | Noted: 2020-08-17

## 2020-08-24 PROBLEM — E78.5 HYPERLIPIDEMIA, UNSPECIFIED: Chronic | Status: ACTIVE | Noted: 2020-08-17

## 2020-08-24 LAB
ANION GAP SERPL CALC-SCNC: 14 MMOL/L — SIGNIFICANT CHANGE UP (ref 5–17)
B BURGDOR C6 AB SER-ACNC: NEGATIVE — SIGNIFICANT CHANGE UP
B BURGDOR IGG+IGM SER-ACNC: 0.14 INDEX — SIGNIFICANT CHANGE UP (ref 0.01–0.89)
BUN SERPL-MCNC: 21 MG/DL — HIGH (ref 8–20)
CALCIUM SERPL-MCNC: 9.3 MG/DL — SIGNIFICANT CHANGE UP (ref 8.6–10.2)
CHLORIDE SERPL-SCNC: 98 MMOL/L — SIGNIFICANT CHANGE UP (ref 98–107)
CO2 SERPL-SCNC: 27 MMOL/L — SIGNIFICANT CHANGE UP (ref 22–29)
CREAT SERPL-MCNC: 1.02 MG/DL — SIGNIFICANT CHANGE UP (ref 0.5–1.3)
GLUCOSE BLDC GLUCOMTR-MCNC: 122 MG/DL — HIGH (ref 70–99)
GLUCOSE BLDC GLUCOMTR-MCNC: 130 MG/DL — HIGH (ref 70–99)
GLUCOSE BLDC GLUCOMTR-MCNC: 134 MG/DL — HIGH (ref 70–99)
GLUCOSE BLDC GLUCOMTR-MCNC: 158 MG/DL — HIGH (ref 70–99)
GLUCOSE SERPL-MCNC: 123 MG/DL — HIGH (ref 70–99)
HCT VFR BLD CALC: 30.2 % — LOW (ref 39–50)
HGB BLD-MCNC: 9.8 G/DL — LOW (ref 13–17)
INR BLD: 1.2 RATIO — HIGH (ref 0.88–1.16)
MAGNESIUM SERPL-MCNC: 2 MG/DL — SIGNIFICANT CHANGE UP (ref 1.6–2.6)
MCHC RBC-ENTMCNC: 27.8 PG — SIGNIFICANT CHANGE UP (ref 27–34)
MCHC RBC-ENTMCNC: 32.5 GM/DL — SIGNIFICANT CHANGE UP (ref 32–36)
MCV RBC AUTO: 85.6 FL — SIGNIFICANT CHANGE UP (ref 80–100)
PLATELET # BLD AUTO: 274 K/UL — SIGNIFICANT CHANGE UP (ref 150–400)
POTASSIUM SERPL-MCNC: 3.7 MMOL/L — SIGNIFICANT CHANGE UP (ref 3.5–5.3)
POTASSIUM SERPL-SCNC: 3.7 MMOL/L — SIGNIFICANT CHANGE UP (ref 3.5–5.3)
PROTHROM AB SERPL-ACNC: 13.8 SEC — HIGH (ref 10.6–13.6)
RBC # BLD: 3.53 M/UL — LOW (ref 4.2–5.8)
RBC # FLD: 13.4 % — SIGNIFICANT CHANGE UP (ref 10.3–14.5)
SODIUM SERPL-SCNC: 139 MMOL/L — SIGNIFICANT CHANGE UP (ref 135–145)
SURGICAL PATHOLOGY STUDY: SIGNIFICANT CHANGE UP
WBC # BLD: 10.7 K/UL — HIGH (ref 3.8–10.5)
WBC # FLD AUTO: 10.7 K/UL — HIGH (ref 3.8–10.5)

## 2020-08-24 PROCEDURE — 99024 POSTOP FOLLOW-UP VISIT: CPT

## 2020-08-24 PROCEDURE — 71045 X-RAY EXAM CHEST 1 VIEW: CPT | Mod: 26

## 2020-08-24 RX ORDER — POTASSIUM CHLORIDE 20 MEQ
40 PACKET (EA) ORAL ONCE
Refills: 0 | Status: COMPLETED | OUTPATIENT
Start: 2020-08-24 | End: 2020-08-24

## 2020-08-24 RX ORDER — WARFARIN SODIUM 2.5 MG/1
2.5 TABLET ORAL ONCE
Refills: 0 | Status: COMPLETED | OUTPATIENT
Start: 2020-08-24 | End: 2020-08-24

## 2020-08-24 RX ADMIN — SODIUM CHLORIDE 3 MILLILITER(S): 9 INJECTION INTRAMUSCULAR; INTRAVENOUS; SUBCUTANEOUS at 05:39

## 2020-08-24 RX ADMIN — CLOPIDOGREL BISULFATE 75 MILLIGRAM(S): 75 TABLET, FILM COATED ORAL at 08:20

## 2020-08-24 RX ADMIN — OXYCODONE AND ACETAMINOPHEN 1 TABLET(S): 5; 325 TABLET ORAL at 23:00

## 2020-08-24 RX ADMIN — OXYCODONE AND ACETAMINOPHEN 1 TABLET(S): 5; 325 TABLET ORAL at 19:18

## 2020-08-24 RX ADMIN — METFORMIN HYDROCHLORIDE 500 MILLIGRAM(S): 850 TABLET ORAL at 08:20

## 2020-08-24 RX ADMIN — CHLORHEXIDINE GLUCONATE 1 APPLICATION(S): 213 SOLUTION TOPICAL at 08:21

## 2020-08-24 RX ADMIN — SODIUM CHLORIDE 3 MILLILITER(S): 9 INJECTION INTRAMUSCULAR; INTRAVENOUS; SUBCUTANEOUS at 22:25

## 2020-08-24 RX ADMIN — Medication 81 MILLIGRAM(S): at 08:20

## 2020-08-24 RX ADMIN — Medication 5 MILLIGRAM(S): at 22:24

## 2020-08-24 RX ADMIN — Medication 20 MILLIGRAM(S): at 05:38

## 2020-08-24 RX ADMIN — OXYCODONE AND ACETAMINOPHEN 1 TABLET(S): 5; 325 TABLET ORAL at 22:25

## 2020-08-24 RX ADMIN — SODIUM CHLORIDE 3 MILLILITER(S): 9 INJECTION INTRAMUSCULAR; INTRAVENOUS; SUBCUTANEOUS at 14:00

## 2020-08-24 RX ADMIN — Medication 50 MILLIGRAM(S): at 05:38

## 2020-08-24 RX ADMIN — OXYCODONE AND ACETAMINOPHEN 1 TABLET(S): 5; 325 TABLET ORAL at 18:08

## 2020-08-24 RX ADMIN — METFORMIN HYDROCHLORIDE 1000 MILLIGRAM(S): 850 TABLET ORAL at 08:20

## 2020-08-24 RX ADMIN — ATORVASTATIN CALCIUM 80 MILLIGRAM(S): 80 TABLET, FILM COATED ORAL at 22:25

## 2020-08-24 RX ADMIN — PANTOPRAZOLE SODIUM 40 MILLIGRAM(S): 20 TABLET, DELAYED RELEASE ORAL at 05:38

## 2020-08-24 RX ADMIN — METFORMIN HYDROCHLORIDE 1000 MILLIGRAM(S): 850 TABLET ORAL at 17:07

## 2020-08-24 RX ADMIN — WARFARIN SODIUM 2.5 MILLIGRAM(S): 2.5 TABLET ORAL at 22:25

## 2020-08-24 RX ADMIN — ENOXAPARIN SODIUM 40 MILLIGRAM(S): 100 INJECTION SUBCUTANEOUS at 08:20

## 2020-08-24 RX ADMIN — Medication 40 MILLIEQUIVALENT(S): at 17:07

## 2020-08-24 RX ADMIN — Medication 50 MILLIGRAM(S): at 17:07

## 2020-08-24 NOTE — PROGRESS NOTE ADULT - SUBJECTIVE AND OBJECTIVE BOX
Subjective:  "Doing ok, think I should be leaving tomorrow"  OOB chair, ambulated independently      Tele:  SR  80s           V/S:                    T(F): 98.6 (20 @ 15:23), Max: 99.4 (20 @ 15:55)  HR: 110 (20 @ 15:23) (80 - 110)  BP: 121/78 (20 @ 15:23) (109/75 - 121/78)  RR: 18 (20 @ 15:23) (16 - 19)  SpO2: 97% (20 @ 15:23) (95% - 100%)  Wt(kg): --      LV EF:      Labs:      139  |  98  |  21.0<H>  ----------------------------<  123<H>  3.7   |  27.0  |  1.02    Ca    9.3      24 Aug 2020 06:08  Mg     2.0                                      9.8    10.70 )-----------( 274      ( 24 Aug 2020 06:08 )             30.2        PT/INR - ( 24 Aug 2020 05:54 )   PT: 13.8 sec;   INR: 1.20 ratio              CAPILLARY BLOOD GLUCOSE      POCT Blood Glucose.: 130 mg/dL (24 Aug 2020 12:03)  POCT Blood Glucose.: 134 mg/dL (24 Aug 2020 08:06)  POCT Blood Glucose.: 157 mg/dL (23 Aug 2020 20:58)  POCT Blood Glucose.: 137 mg/dL (23 Aug 2020 17:12)           CXR:    I&O's Detail    23 Aug 2020 07:  -  24 Aug 2020 07:00  --------------------------------------------------------  IN:    Oral Fluid: 1400 mL  Total IN: 1400 mL    OUT:    Voided: 1100 mL  Total OUT: 1100 mL    Total NET: 300 mL      24 Aug 2020 07:01  -  24 Aug 2020 15:50  --------------------------------------------------------  IN:    Oral Fluid: 720 mL  Total IN: 720 mL    OUT:  Total OUT: 0 mL    Total NET: 720 mL          CHEST TUBE:  [ ] YES [x ] NO  OUTPUT:     per 24 hours    AIR LEAKS:  [ ] YES [ ] NO    x  JACOB DRAIN:   [ ] YES [x ] NO  OUTPUT:     per 24 hours    EPICARDIAL WIRES:  [x ] YES [ ] NO      BOWEL MOVEMENT:  [x ] YES [ ] NO      Daily     Daily Weight in k.4 (24 Aug 2020 06:00)  Medications:  aspirin  chewable 81 milliGRAM(s) Oral daily  atorvastatin 80 milliGRAM(s) Oral at bedtime  chlorhexidine 2% Cloths 1 Application(s) Topical daily  clopidogrel Tablet 75 milliGRAM(s) Oral daily  dextrose 40% Gel 15 Gram(s) Oral once PRN  dextrose 5%. 1000 milliLiter(s) IV Continuous <Continuous>  dextrose 50% Injectable 50 milliLiter(s) IV Push every 15 minutes  dextrose 50% Injectable 25 milliLiter(s) IV Push every 15 minutes  enoxaparin Injectable 40 milliGRAM(s) SubCutaneous daily  furosemide    Tablet 20 milliGRAM(s) Oral daily  glucagon  Injectable 1 milliGRAM(s) IntraMuscular once PRN  insulin lispro (HumaLOG) corrective regimen sliding scale   SubCutaneous Before meals and at bedtime  melatonin 5 milliGRAM(s) Oral at bedtime PRN  metFORMIN 1000 milliGRAM(s) Oral two times a day with meals  metoprolol tartrate 50 milliGRAM(s) Oral two times a day  oxycodone    5 mG/acetaminophen 325 mG 1 Tablet(s) Oral every 4 hours PRN  oxycodone    5 mG/acetaminophen 325 mG 2 Tablet(s) Oral every 4 hours PRN  pantoprazole    Tablet 40 milliGRAM(s) Oral before breakfast  polyethylene glycol 3350 17 Gram(s) Oral daily  potassium chloride    Tablet ER 40 milliEquivalent(s) Oral once  senna 2 Tablet(s) Oral at bedtime  sodium chloride 0.9% lock flush 3 milliLiter(s) IV Push every 8 hours  warfarin 2.5 milliGRAM(s) Oral once        Physical Exam:    General: well nourished, well developed, no acute distress    Respiratory: Breath sounds clear, diminished at the bases bilaterally    CV: regular rate and rhythm, normal S1, S2    Abdomen: soft, nontender, nondistended, positive bowel sounds, last bowel movement     Extremities: warm, well perfused. +1 edema BLE,  + DP pulses bilaterally    Incisions: midline sternal incision, + mepilex, c/d/i. sternum stable.    Epicardial Wires: V wires isolated> removed              PAST MEDICAL & SURGICAL HISTORY:  Hyperlipidemia  Diabetes mellitus, type 2

## 2020-08-24 NOTE — PROGRESS NOTE ADULT - SUBJECTIVE AND OBJECTIVE BOX
Subjective: Patient lying in bed, no acte distress noted, denies chest pain, pressure, palpitation, dizziness, shortness of breath, abdominal pain, N/V/D.       VITAL SIGNS  Vital Signs Last 24 Hrs  T(C): 36.9 (20 @ 21:51), Max: 37.4 (20 @ 15:55)  T(F): 98.4 (20 @ 21:51), Max: 99.4 (20 @ 15:55)  HR: 85 (20 @ 21:51) (78 - 105)  BP: 109/75 (20 @ 21:51) (109/75 - 127/85)  RR: 18 (20 @ 21:51) (18 - 19)  SpO2: 95% (20 @ 21:51) (95% - 98%)  on room air         Telemetry/Alarms:  SR with PACS  LVEF: 55-60%    MEDICATIONS  aspirin  chewable 81 milliGRAM(s) Oral daily  atorvastatin 80 milliGRAM(s) Oral at bedtime  chlorhexidine 2% Cloths 1 Application(s) Topical daily  clopidogrel Tablet 75 milliGRAM(s) Oral daily  enoxaparin Injectable 40 milliGRAM(s) SubCutaneous daily  furosemide    Tablet 20 milliGRAM(s) Oral daily  glucagon  Injectable 1 milliGRAM(s) IntraMuscular once PRN  insulin lispro (HumaLOG) corrective regimen sliding scale   SubCutaneous Before meals and at bedtime  melatonin 5 milliGRAM(s) Oral at bedtime PRN  metFORMIN 1000 milliGRAM(s) Oral two times a day with meals  metFORMIN 500 milliGRAM(s) Oral two times a day with meals  metoprolol tartrate 50 milliGRAM(s) Oral two times a day  oxycodone    5 mG/acetaminophen 325 mG 1 Tablet(s) Oral every 4 hours PRN  oxycodone    5 mG/acetaminophen 325 mG 2 Tablet(s) Oral every 4 hours PRN  pantoprazole    Tablet 40 milliGRAM(s) Oral before breakfast  polyethylene glycol 3350 17 Gram(s) Oral daily  senna 2 Tablet(s) Oral at bedtime  sodium chloride 0.9% lock flush 3 milliLiter(s) IV Push every 8 hours      PHYSICAL EXAM  General: well nourished, well developed, no acute distress  Neurology: alert and oriented x 3, nonfocal, no gross deficits  Respiratory: Breath sounds clear, diminished at the bases bilaterally  CV: regular rate and rhythm, normal S1, S2  Abdomen: soft, nontender, nondistended, positive bowel sounds, last bowel movement   Extremities: warm, well perfused. +1 edema BLE,  + DP pulses bilaterally  Incisions: midline sternal incision, + mepilex, c/d/i. sternum stable.  Epicardial Wires: V wires isolated       @ 07:  -   @ 07:00  --------------------------------------------------------  IN: 300 mL / OUT: 1650 mL / NET: -1350 mL     @ 07:01  -   @ 02:56  --------------------------------------------------------  IN: 1400 mL / OUT: 1100 mL / NET: 300 mL        Weights:  Daily     Daily Weight in k (23 Aug 2020 06:09)  Admit Wt: Drug Dosing Weight  Height (cm): 177.8 (20 Aug 2020 07:25)  Weight (kg): 95.5 (20 Aug 2020 07:25)  BMI (kg/m2): 30.2 (20 Aug 2020 07:25)  BSA (m2): 2.13 (20 Aug 2020 07:25)    All laboratory results, radiology and medications reviewed.    LABS      140  |  101  |  18.0  ----------------------------<  139<H>  3.8   |  27.0  |  0.85    Ca    8.8      23 Aug 2020 07:07  Mg     2.2                                        10.0   12.32 )-----------( 240      ( 23 Aug 2020 07:07 )             30.4          PT/INR - ( 23 Aug 2020 07:07 )   PT: 13.4 sec;   INR: 1.16 ratio             CAPILLARY BLOOD GLUCOSE      POCT Blood Glucose.: 157 mg/dL (23 Aug 2020 20:58)  POCT Blood Glucose.: 137 mg/dL (23 Aug 2020 17:12)  POCT Blood Glucose.: 175 mg/dL (23 Aug 2020 11:59)  POCT Blood Glucose.: 161 mg/dL (23 Aug 2020 08:05)           < from: Xray Chest 1 View- PORTABLE-Urgent (20 @ 09:06) >      IMPRESSION:  Status post open heart surgery.  No evidence of active chest pathology. Residual LEFT lung base linear atelectasis.  /    < end of copied text >    < from: TTE Echo Complete w/o Contrast w/ Doppler (20 @ 11:56) >    Summary:   1. Technically good study.   2. Endocardial visualization was enhanced with intravenous echo contrast.   3. Normal global left ventricular systolic function.   4. Left ventricular ejection fraction, by visual estimation, is 55 to 60%.   5. Multiple left ventricular regional wall motion abnormalities exist. See wall motion findings.   6. Spectral Doppler shows impaired relaxation pattern of left ventricular myocardial filling (Grade I diastolic dysfunction).   7. Trace mitral valve regurgitation.   8. There is no evidence of pericardial effusion.      < end of copied text >      PAST MEDICAL & SURGICAL HISTORY:  Hyperlipidemia  Diabetes mellitus, type 2

## 2020-08-25 ENCOUNTER — TRANSCRIPTION ENCOUNTER (OUTPATIENT)
Age: 55
End: 2020-08-25

## 2020-08-25 VITALS
TEMPERATURE: 98 F | SYSTOLIC BLOOD PRESSURE: 122 MMHG | HEART RATE: 82 BPM | OXYGEN SATURATION: 98 % | DIASTOLIC BLOOD PRESSURE: 77 MMHG | RESPIRATION RATE: 18 BRPM

## 2020-08-25 LAB
ANION GAP SERPL CALC-SCNC: 12 MMOL/L — SIGNIFICANT CHANGE UP (ref 5–17)
BUN SERPL-MCNC: 23 MG/DL — HIGH (ref 8–20)
CALCIUM SERPL-MCNC: 9.3 MG/DL — SIGNIFICANT CHANGE UP (ref 8.6–10.2)
CHLORIDE SERPL-SCNC: 100 MMOL/L — SIGNIFICANT CHANGE UP (ref 98–107)
CO2 SERPL-SCNC: 28 MMOL/L — SIGNIFICANT CHANGE UP (ref 22–29)
CREAT SERPL-MCNC: 0.98 MG/DL — SIGNIFICANT CHANGE UP (ref 0.5–1.3)
GLUCOSE SERPL-MCNC: 131 MG/DL — HIGH (ref 70–99)
HCT VFR BLD CALC: 30.6 % — LOW (ref 39–50)
HGB BLD-MCNC: 9.9 G/DL — LOW (ref 13–17)
INR BLD: 1.17 RATIO — HIGH (ref 0.88–1.16)
MAGNESIUM SERPL-MCNC: 1.8 MG/DL — SIGNIFICANT CHANGE UP (ref 1.8–2.6)
MCHC RBC-ENTMCNC: 27.5 PG — SIGNIFICANT CHANGE UP (ref 27–34)
MCHC RBC-ENTMCNC: 32.4 GM/DL — SIGNIFICANT CHANGE UP (ref 32–36)
MCV RBC AUTO: 85 FL — SIGNIFICANT CHANGE UP (ref 80–100)
PLATELET # BLD AUTO: 320 K/UL — SIGNIFICANT CHANGE UP (ref 150–400)
POTASSIUM SERPL-MCNC: 4.1 MMOL/L — SIGNIFICANT CHANGE UP (ref 3.5–5.3)
POTASSIUM SERPL-SCNC: 4.1 MMOL/L — SIGNIFICANT CHANGE UP (ref 3.5–5.3)
PROTHROM AB SERPL-ACNC: 13.5 SEC — SIGNIFICANT CHANGE UP (ref 10.6–13.6)
RBC # BLD: 3.6 M/UL — LOW (ref 4.2–5.8)
RBC # FLD: 13.2 % — SIGNIFICANT CHANGE UP (ref 10.3–14.5)
SODIUM SERPL-SCNC: 139 MMOL/L — SIGNIFICANT CHANGE UP (ref 135–145)
WBC # BLD: 8.96 K/UL — SIGNIFICANT CHANGE UP (ref 3.8–10.5)
WBC # FLD AUTO: 8.96 K/UL — SIGNIFICANT CHANGE UP (ref 3.8–10.5)

## 2020-08-25 PROCEDURE — 86901 BLOOD TYPING SEROLOGIC RH(D): CPT

## 2020-08-25 PROCEDURE — 85027 COMPLETE CBC AUTOMATED: CPT

## 2020-08-25 PROCEDURE — C1889: CPT

## 2020-08-25 PROCEDURE — 88311 DECALCIFY TISSUE: CPT

## 2020-08-25 PROCEDURE — 71045 X-RAY EXAM CHEST 1 VIEW: CPT

## 2020-08-25 PROCEDURE — 82550 ASSAY OF CK (CPK): CPT

## 2020-08-25 PROCEDURE — 82803 BLOOD GASES ANY COMBINATION: CPT

## 2020-08-25 PROCEDURE — 84132 ASSAY OF SERUM POTASSIUM: CPT

## 2020-08-25 PROCEDURE — 93005 ELECTROCARDIOGRAM TRACING: CPT

## 2020-08-25 PROCEDURE — 80048 BASIC METABOLIC PNL TOTAL CA: CPT

## 2020-08-25 PROCEDURE — 87641 MR-STAPH DNA AMP PROBE: CPT

## 2020-08-25 PROCEDURE — 82553 CREATINE MB FRACTION: CPT

## 2020-08-25 PROCEDURE — 80061 LIPID PANEL: CPT

## 2020-08-25 PROCEDURE — 85610 PROTHROMBIN TIME: CPT

## 2020-08-25 PROCEDURE — 87640 STAPH A DNA AMP PROBE: CPT

## 2020-08-25 PROCEDURE — 36415 COLL VENOUS BLD VENIPUNCTURE: CPT

## 2020-08-25 PROCEDURE — 94002 VENT MGMT INPAT INIT DAY: CPT

## 2020-08-25 PROCEDURE — 93306 TTE W/DOPPLER COMPLETE: CPT

## 2020-08-25 PROCEDURE — 82962 GLUCOSE BLOOD TEST: CPT

## 2020-08-25 PROCEDURE — 86850 RBC ANTIBODY SCREEN: CPT

## 2020-08-25 PROCEDURE — 81001 URINALYSIS AUTO W/SCOPE: CPT

## 2020-08-25 PROCEDURE — 99232 SBSQ HOSP IP/OBS MODERATE 35: CPT

## 2020-08-25 PROCEDURE — 86618 LYME DISEASE ANTIBODY: CPT

## 2020-08-25 PROCEDURE — 87635 SARS-COV-2 COVID-19 AMP PRB: CPT

## 2020-08-25 PROCEDURE — 71045 X-RAY EXAM CHEST 1 VIEW: CPT | Mod: 26

## 2020-08-25 PROCEDURE — 80053 COMPREHEN METABOLIC PANEL: CPT

## 2020-08-25 PROCEDURE — 82947 ASSAY GLUCOSE BLOOD QUANT: CPT

## 2020-08-25 PROCEDURE — 84134 ASSAY OF PREALBUMIN: CPT

## 2020-08-25 PROCEDURE — 93880 EXTRACRANIAL BILAT STUDY: CPT

## 2020-08-25 PROCEDURE — 84100 ASSAY OF PHOSPHORUS: CPT

## 2020-08-25 PROCEDURE — 83880 ASSAY OF NATRIURETIC PEPTIDE: CPT

## 2020-08-25 PROCEDURE — 88304 TISSUE EXAM BY PATHOLOGIST: CPT

## 2020-08-25 PROCEDURE — 86923 COMPATIBILITY TEST ELECTRIC: CPT

## 2020-08-25 PROCEDURE — 94010 BREATHING CAPACITY TEST: CPT

## 2020-08-25 PROCEDURE — 85730 THROMBOPLASTIN TIME PARTIAL: CPT

## 2020-08-25 PROCEDURE — 86769 SARS-COV-2 COVID-19 ANTIBODY: CPT

## 2020-08-25 PROCEDURE — 93308 TTE F-UP OR LMTD: CPT | Mod: 26

## 2020-08-25 PROCEDURE — 97163 PT EVAL HIGH COMPLEX 45 MIN: CPT

## 2020-08-25 PROCEDURE — 93312 ECHO TRANSESOPHAGEAL: CPT

## 2020-08-25 PROCEDURE — 84484 ASSAY OF TROPONIN QUANT: CPT

## 2020-08-25 PROCEDURE — 83036 HEMOGLOBIN GLYCOSYLATED A1C: CPT

## 2020-08-25 PROCEDURE — 84443 ASSAY THYROID STIM HORMONE: CPT

## 2020-08-25 PROCEDURE — 82435 ASSAY OF BLOOD CHLORIDE: CPT

## 2020-08-25 PROCEDURE — 85576 BLOOD PLATELET AGGREGATION: CPT

## 2020-08-25 PROCEDURE — 83735 ASSAY OF MAGNESIUM: CPT

## 2020-08-25 PROCEDURE — 83605 ASSAY OF LACTIC ACID: CPT

## 2020-08-25 PROCEDURE — 82330 ASSAY OF CALCIUM: CPT

## 2020-08-25 PROCEDURE — 85014 HEMATOCRIT: CPT

## 2020-08-25 PROCEDURE — 86900 BLOOD TYPING SEROLOGIC ABO: CPT

## 2020-08-25 PROCEDURE — 84295 ASSAY OF SERUM SODIUM: CPT

## 2020-08-25 RX ORDER — METOPROLOL TARTRATE 50 MG
1 TABLET ORAL
Qty: 60 | Refills: 1
Start: 2020-08-25 | End: 2020-10-23

## 2020-08-25 RX ORDER — MAGNESIUM SULFATE 500 MG/ML
2 VIAL (ML) INJECTION ONCE
Refills: 0 | Status: COMPLETED | OUTPATIENT
Start: 2020-08-25 | End: 2020-08-25

## 2020-08-25 RX ORDER — ATORVASTATIN CALCIUM 80 MG/1
1 TABLET, FILM COATED ORAL
Qty: 0 | Refills: 0 | DISCHARGE

## 2020-08-25 RX ORDER — APIXABAN 2.5 MG/1
2.5 TABLET, FILM COATED ORAL
Refills: 0 | Status: DISCONTINUED | OUTPATIENT
Start: 2020-08-25 | End: 2020-08-25

## 2020-08-25 RX ORDER — LANOLIN ALCOHOL/MO/W.PET/CERES
1 CREAM (GRAM) TOPICAL
Qty: 0 | Refills: 0 | DISCHARGE
Start: 2020-08-25

## 2020-08-25 RX ORDER — APIXABAN 2.5 MG/1
1 TABLET, FILM COATED ORAL
Qty: 60 | Refills: 1
Start: 2020-08-25 | End: 2020-10-23

## 2020-08-25 RX ORDER — OXYCODONE AND ACETAMINOPHEN 5; 325 MG/1; MG/1
1 TABLET ORAL
Qty: 30 | Refills: 0
Start: 2020-08-25 | End: 2020-08-29

## 2020-08-25 RX ORDER — METFORMIN HYDROCHLORIDE 850 MG/1
1 TABLET ORAL
Qty: 0 | Refills: 0 | DISCHARGE

## 2020-08-25 RX ORDER — SENNA PLUS 8.6 MG/1
2 TABLET ORAL
Qty: 60 | Refills: 0
Start: 2020-08-25 | End: 2020-09-23

## 2020-08-25 RX ORDER — METFORMIN HYDROCHLORIDE 850 MG/1
1 TABLET ORAL
Qty: 60 | Refills: 1
Start: 2020-08-25 | End: 2020-10-23

## 2020-08-25 RX ORDER — POTASSIUM CHLORIDE 20 MEQ
1 PACKET (EA) ORAL
Qty: 14 | Refills: 0
Start: 2020-08-25 | End: 2020-09-07

## 2020-08-25 RX ORDER — ASPIRIN/CALCIUM CARB/MAGNESIUM 324 MG
1 TABLET ORAL
Qty: 0 | Refills: 0 | DISCHARGE

## 2020-08-25 RX ORDER — ATORVASTATIN CALCIUM 80 MG/1
1 TABLET, FILM COATED ORAL
Qty: 30 | Refills: 1
Start: 2020-08-25 | End: 2020-10-23

## 2020-08-25 RX ORDER — ASPIRIN/CALCIUM CARB/MAGNESIUM 324 MG
1 TABLET ORAL
Qty: 30 | Refills: 1
Start: 2020-08-25 | End: 2020-10-23

## 2020-08-25 RX ORDER — FUROSEMIDE 40 MG
1 TABLET ORAL
Qty: 14 | Refills: 0
Start: 2020-08-25 | End: 2020-09-07

## 2020-08-25 RX ADMIN — APIXABAN 2.5 MILLIGRAM(S): 2.5 TABLET, FILM COATED ORAL at 12:09

## 2020-08-25 RX ADMIN — Medication 50 MILLIGRAM(S): at 05:55

## 2020-08-25 RX ADMIN — CHLORHEXIDINE GLUCONATE 1 APPLICATION(S): 213 SOLUTION TOPICAL at 08:12

## 2020-08-25 RX ADMIN — Medication 81 MILLIGRAM(S): at 08:16

## 2020-08-25 RX ADMIN — SODIUM CHLORIDE 3 MILLILITER(S): 9 INJECTION INTRAMUSCULAR; INTRAVENOUS; SUBCUTANEOUS at 05:55

## 2020-08-25 RX ADMIN — PANTOPRAZOLE SODIUM 40 MILLIGRAM(S): 20 TABLET, DELAYED RELEASE ORAL at 05:56

## 2020-08-25 RX ADMIN — METFORMIN HYDROCHLORIDE 1000 MILLIGRAM(S): 850 TABLET ORAL at 08:16

## 2020-08-25 RX ADMIN — CLOPIDOGREL BISULFATE 75 MILLIGRAM(S): 75 TABLET, FILM COATED ORAL at 08:16

## 2020-08-25 RX ADMIN — Medication 50 GRAM(S): at 08:31

## 2020-08-25 RX ADMIN — POLYETHYLENE GLYCOL 3350 17 GRAM(S): 17 POWDER, FOR SOLUTION ORAL at 08:16

## 2020-08-25 RX ADMIN — Medication 20 MILLIGRAM(S): at 05:56

## 2020-08-25 NOTE — DISCHARGE NOTE PROVIDER - CARE PROVIDERS DIRECT ADDRESSES
[email or web address]

## 2020-08-25 NOTE — DISCHARGE NOTE PROVIDER - NSDCACTIVITY_GEN_ALL_CORE
Showering allowed/Do not make important decisions/Walking - Indoors allowed/Stairs allowed/Walking - Outdoors allowed/Do not drive or operate machinery/No heavy lifting/straining

## 2020-08-25 NOTE — DISCHARGE NOTE PROVIDER - CARE PROVIDER_API CALL
Abel Plasencia  SURGERY  [address] [place] [address]  Phone: [phone]  Fax: [phone]  Follow Up Time:     Umesh Mills  CARDIOVASCULAR DISEASE  [address] [place] [address]  Phone: [phone]  Fax: [phone]  Follow Up Time:

## 2020-08-25 NOTE — DISCHARGE NOTE NURSING/CASE MANAGEMENT/SOCIAL WORK - NSDCFUADDAPPT_GEN_ALL_CORE_FT
Please follow up with Dr. Plasencia on Tuesday Sept 8th at 10:30AM.     The cardiac surgery office is on the second floor of [facility]. Please ask for directions at the .

## 2020-08-25 NOTE — PROGRESS NOTE ADULT - SUBJECTIVE AND OBJECTIVE BOX
Subjective: Patient lying in bed, no acute distress noted, sated "I have numbness of hand, pinky and ring finger" denies chest pain, palpitation, dizziness, headache, shortness of breath, abdominal pain, N/V/D.     VITAL SIGNS  Vital Signs Last 24 Hrs  T(C): 36.8 (20 @ 22:22), Max: 37 (20 @ 05:37)  T(F): 98.3 (20 @ 22:22), Max: 98.6 (20 @ 05:37)  HR: 88 (20 @ 22:22) (80 - 111)  BP: 117/72 (20 @ 22:22) (110/72 - 121/78)  RR: 16 (20 @ 22:22) (16 - 18)  SpO2: 98% (20 @ 22:22) (95% - 100%)  on room air    Telemetry/Alarms:  SR with occasional PACs  LVEF: 55-60%    MEDICATIONS  aspirin  chewable 81 milliGRAM(s) Oral daily  atorvastatin 80 milliGRAM(s) Oral at bedtime  chlorhexidine 2% Cloths 1 Application(s) Topical daily  clopidogrel Tablet 75 milliGRAM(s) Oral daily  furosemide    Tablet 20 milliGRAM(s) Oral daily  glucagon  Injectable 1 milliGRAM(s) IntraMuscular once PRN  melatonin 5 milliGRAM(s) Oral at bedtime PRN  metFORMIN 1000 milliGRAM(s) Oral two times a day with meals  metoprolol tartrate 50 milliGRAM(s) Oral two times a day  oxycodone    5 mG/acetaminophen 325 mG 1 Tablet(s) Oral every 4 hours PRN  oxycodone    5 mG/acetaminophen 325 mG 2 Tablet(s) Oral every 4 hours PRN  pantoprazole    Tablet 40 milliGRAM(s) Oral before breakfast  polyethylene glycol 3350 17 Gram(s) Oral daily  senna 2 Tablet(s) Oral at bedtime  sodium chloride 0.9% lock flush 3 milliLiter(s) IV Push every 8 hours      PHYSICAL EXAM  General: well nourished, well developed, no acute distress  Neurology: alert and oriented x 3, nonfocal, no gross deficits  Respiratory: clear to auscultation bilaterally, diminished at the bases.   CV: regular rate and rhythm, normal S1, S2  Abdomen: soft, nontender, nondistended, positive bowel sounds, last bowel movement   Extremities: warm, well perfused. trace BLE bilaterally. + DP pulses bilaterally  Incisions: midline sternal incision, + mepilex, c/d/i. sternum stable. RLE harvest site dry and intact.   Skin: Warm, well perfused.      @ 07:  -   @ 07:00  --------------------------------------------------------  IN: 1400 mL / OUT: 1100 mL / NET: 300 mL     @ :  -   @ 00:53  --------------------------------------------------------  IN: 1440 mL / OUT: 0 mL / NET: 1440 mL        Weights:  Daily     Daily Weight in k.4 (24 Aug 2020 06:00)  Admit Wt: Drug Dosing Weight  Height (cm): 177.8 (20 Aug 2020 07:25)  Weight (kg): 95.5 (20 Aug 2020 07:25)  BMI (kg/m2): 30.2 (20 Aug 2020 07:25)  BSA (m2): 2.13 (20 Aug 2020 07:25)    All laboratory results, radiology and medications reviewed.    LABS      139  |  98  |  21.0<H>  ----------------------------<  123<H>  3.7   |  27.0  |  1.02    Ca    9.3      24 Aug 2020 06:08  Mg     2.0                                        9.8    10.70 )-----------( 274      ( 24 Aug 2020 06:08 )             30.2          PT/INR - ( 24 Aug 2020 05:54 )   PT: 13.8 sec;   INR: 1.20 ratio             CAPILLARY BLOOD GLUCOSE      POCT Blood Glucose.: 158 mg/dL (24 Aug 2020 21:46)  POCT Blood Glucose.: 122 mg/dL (24 Aug 2020 16:54)  POCT Blood Glucose.: 130 mg/dL (24 Aug 2020 12:03)  POCT Blood Glucose.: 134 mg/dL (24 Aug 2020 08:06)           < from: Xray Chest 1 View- PORTABLE-Routine (20 @ 05:55) >    Frontal expiratory view of the chest shows the heart to be similar in size. The lungs show similar mild left base atelectasis with pleural effusion and there is no evidence of pneumothorax nor right pleural effusion.    IMPRESSION:  No interval change.      < end of copied text >  < from: TTE Echo Complete w/o Contrast w/ Doppler (20 @ 11:56) >    Summary:   1. Technically good study.   2. Endocardial visualization was enhanced with intravenous echo contrast.   3. Normal global left ventricular systolic function.   4. Left ventricular ejection fraction, by visual estimation, is 55 to 60%.   5. Multiple left ventricular regional wall motion abnormalities exist. See wall motion findings.   6. Spectral Doppler shows impaired relaxation pattern of left ventricular myocardial filling (Grade I diastolic dysfunction).   7. Trace mitral valve regurgitation.   8. There is no evidence of pericardial effusion.    < end of copied text >    < from: 12 Lead ECG (20 @ 06:14) >    Ventricular Rate 98 BPM    Atrial Rate 98 BPM    P-R Interval 148 ms    QRS Duration 86 ms    Q-T Interval 350 ms    QTC Calculation(Bezet) 446 ms    P Axis 40 degrees    R Axis 11 degrees    T Axis 45 degrees    Diagnosis Line Normal sinus rhythm  Possible Left atrial enlargement  Possible Acute pericarditis  Abnormal ECG    < end of copied text >      PAST MEDICAL & SURGICAL HISTORY:  Hyperlipidemia  Diabetes mellitus, type 2

## 2020-08-25 NOTE — PROGRESS NOTE ADULT - PROBLEM SELECTOR PLAN 4
SCDs and Coumadin for DVT prophylaxis.  Pantoprazole for GI prophylaxis.  Dispo: Home possibly today    Care plan to be discussed with CT Surgery attending / team on AM rounds.
SCDs and Lovenox for DVT prophylaxis.  Pantoprazole for GI prophylaxis.    Care plan to be discussed with CT Surgery attending / team on AM rounds.
SCDs, Lovenox for DVT prophylaxis  Pantoprazole for GI prophylaxis
SCDs, Lovenox for DVT prophylaxis  Pantoprazole for GI prophylaxis

## 2020-08-25 NOTE — DISCHARGE NOTE PROVIDER - NSDCHHNEEDSERVICE_GEN_ALL_CORE
Medication teaching and assessment/Observation and assessment/Wound care and assessment
(2) good, crying

## 2020-08-25 NOTE — PROGRESS NOTE ADULT - PROBLEM SELECTOR PLAN 1
S/p C3L with LAD endarterectomy 8/20.   Hemodynamically stable.   Continue ASA and plavix for acute graft patency ppx.   Continue statin for chronic graft patency ppx.  Dosing coumadin daily and trending INR s/p endarterectomy.  Continue beta-blocker as tolerated.   Encourage incentive spirometer hourly while awake.  Encourage CDBE, coughing.   Ambulate with PT assist as tolerated.   Pain control PRN.   Continue bowel regimen PRN  Pacing wires removed om 8/24, needed to have repeat ECHO prior to D/C home (Patient is on Coumadin therapy)
S/p C3L with LAD endarterectomy 8/20.   Hemodynamically stable.   Continue ASA and plavix for acute graft patency ppx.   Continue statin for chronic graft patency ppx.  Dosing coumadin daily and trending INR s/p endarterectomy.  Continue beta-blocker as tolerated.   Encourage incentive spirometer hourly while awake.  Encourage CDBE, coughing.   Ambulate with PT assist as tolerated.   Pain control PRN.   Continue bowel regimen PRN.
S/p C3L with LAD endarterectomy 8/20.   Neurologically intact.   Hemodynamically stable.   Continue ASA and plavix for acute graft patency ppx.   Continue statin for chronic graft patency ppx.  Dosing coumadin daily and trending INR s/p endarterectomy.  Continue beta-blocker as tolerated.   Encourage incentive spirometer hourly while awake.  Encourage CDBE, coughing.   Ambulate with PT assist as tolerated.   Pain control PRN.   Continue bowel regimen PRN.
S/p C3L with LAD endarterectomy 8/20.   Neurologically intact.   Hemodynamically stable.   Continue ASA and plavix for acute graft patency ppx.   Continue statin for chronic graft patency ppx.  Dosing coumadin daily and trending INR s/p endarterectomy.  Continue beta-blocker as tolerated.   Encourage incentive spirometer hourly while awake.  Encourage CDBE, coughing.   Ambulate with PT assist as tolerated.   Pain control PRN.   Continue bowel regimen PRN.
s/p C3L with LAD endarterectomy  Maintain MAP > 90  Extubated, on 3L NC, encourage IS hourly while awake  Ambulate with PT assist later today  Continue ASA and Plavix for graft patency  Continue Statin for anti inflammatory properties
s/p C3L with LAD endarterectomy  Encourage IS hourly while awake  Ambulate with PT assist later today  Continue ASA and Plavix for graft patency  Continue Statin for anti inflammatory properties  D/C CTs, central line, live saleem this am  Pt most likely will transfer to floor later today

## 2020-08-25 NOTE — DISCHARGE NOTE PROVIDER - INSTRUCTIONS
Please follow diabetic diet(consistent carbohydrates, LOW/NO ADDED SUGAR) diet and monitor your glucose (sugar) levels. You have an increased risk of complications, including wound infections, due to the diabetes.   Check your glucoses 3-4 times daily before meals and bedtime an keep a log of your sugar level.  Take ALL of your medications as prescribed. Only hold medications for low glucose levels (< 80) or if you are symptomatic (dizzy, sweating, lightheaded).  Ideally, we would like all of the glucose levels to be between .  Make an appointment to meet with your primary care provider or endocrinologist within a week

## 2020-08-25 NOTE — DISCHARGE NOTE NURSING/CASE MANAGEMENT/SOCIAL WORK - PATIENT PORTAL LINK FT
You can access the FollowMyHealth Patient Portal offered by [facility] by registering at the following website: [email or web address]. By joining [email or web address] FollowMyHealth portal, you will also be able to view your health information using other applications (apps) compatible with our system.

## 2020-08-25 NOTE — DISCHARGE NOTE PROVIDER - NSDCCPCAREPLAN_GEN_ALL_CORE_FT
PRINCIPAL DISCHARGE DIAGNOSIS  Diagnosis: Coronary artery disease of native artery of native heart with stable angina pectoris  Assessment and Plan of Treatment: 1. Take ALL of your medications as ordered. Fill your prescriptions the day you are discharged and take according to the schedule you were given. Continue to take a stool softener if you are taking narcotic pain medications. AVOID medications such as ibuprofen or naproxen if you have had bypass surgery. If you have any questions or are unable to fill the prescriptions, please call the office right away at [phone].  2. Shower daily. Clean all incisions daily while showering with warm water and mild soap, pat dry with a clean towel and do not cover with any dressings unless instructed to. No bathing, swimming in a pool or the ocean until instructed by MD.  DO NOT use creams or lotions on the wound.  3. We advise that you do not drive until instructed by MD. Sit in the rear passenger seat with the red pillow between chest and seatbelt to avoid injury in the event of a motor vehicle accident until you see the surgeon again in the office.  4. You may not return to work until instructed by MD.   5. Please eat a low fat, low cholesterol, low salt diet. (No added/extra salt). A nutritional supplement like Ensure or Glucerna (for diabetics) may help you reach your nutritional goals after surgery and aid in your recovery.  6. Weigh yourself every day in the morning and record it in the weight log in your red folder. Notify the office of any weight gain more than 2-3 pounds in 24 hours.  **Please LIMIT your fluid intake to less than a liter a day.**  7. Continue breathing exercises several times a day. Continue to use your heart pillow when coughing.  8. No heavy lifting nothing greater than 5 pounds until cleared by MD. We recommend that you sleep on your back and not your side or stomach for the next 4-6 weeks.  9. Call / Notify MD any fever greater than 101.0, any drainage from incisions or if they become red, hot or very tender to the touch.  10. Increase activity as tolerated. Walk indoors and/or outdoors at least 3 times a day.  Coronary artery disease of native artery of adan      SECONDARY DISCHARGE DIAGNOSES  Diagnosis: Hyperlipidemia, unspecified hyperlipidemia type  Assessment and Plan of Treatment: Take all medicine as ordered  Continue to take low fat, low cholesterol low sodium diet    Diagnosis: Type 2 diabetes mellitus without complication, with long-term current use of insulin  Assessment and Plan of Treatment: It is extremely important to follow a diabetic (consistent carbohydrates, LOW/NO ADDED SUGAR) diet and monitor your glucose (sugar) levels. You have an increased risk of complications, including wound infections, due to the diabetes.  1. Check your glucoses 3-4 times daily before meals and bedtime.   2. Keep a log of your glucose levels every day.   3. Make an appointment to meet with your primary care provider or endocrinologist within a week.   4. Take ALL of your medications as prescribed. Only hold medications for low glucose levels (< 80) or if you are symptomatic (dizzy, sweating, lightheaded).  5. Ideally, we would like all of the glucose levels to be between .   You may have been discharged on different medications than you were taking before. Your body reacts differently to the medications after surgery. Please continue to take the medications as prescribed and notify the office, nurse practitioner or your PCP if you have any concerns right away. PRINCIPAL DISCHARGE DIAGNOSIS  Diagnosis: Coronary artery disease of native artery of native heart with stable angina pectoris  Assessment and Plan of Treatment: 1. Take ALL of your medications as ordered. Fill your prescriptions the day you are discharged and take according to the schedule you were given. Continue to take a stool softener if you are taking narcotic pain medications. AVOID medications such as ibuprofen or naproxen if you have had bypass surgery. If you have any questions or are unable to fill the prescriptions, please call the office right away at [phone].  2. Shower daily. Clean all incisions daily while showering with warm water and mild soap, pat dry with a clean towel and do not cover with any dressings unless instructed to. No bathing, swimming in a pool or the ocean until instructed by MD.  DO NOT use creams or lotions on the wound.  3. We advise that you do not drive until instructed by MD. Sit in the rear passenger seat with the red pillow between chest and seatbelt to avoid injury in the event of a motor vehicle accident until you see the surgeon again in the office.  4. You may not return to work until instructed by MD.   5. Please eat a low fat, low cholesterol, low salt diet. (No added/extra salt). A nutritional supplement like Ensure or Glucerna (for diabetics) may help you reach your nutritional goals after surgery and aid in your recovery.  6. Weigh yourself every day in the morning and record it in the weight log in your red folder. Notify the office of any weight gain more than 2-3 pounds in 24 hours.  **Please LIMIT your fluid intake to less than a liter a day.**  7. Continue breathing exercises several times a day. Continue to use your heart pillow when coughing.  8. No heavy lifting nothing greater than 5 pounds until cleared by MD. We recommend that you sleep on your back and not your side or stomach for the next 4-6 weeks.  9. Call / Notify MD any fever greater than 101.0, any drainage from incisions or if they become red, hot or very tender to the touch.  10. Increase activity as tolerated. Walk indoors and/or outdoors at least 3 times a day.      SECONDARY DISCHARGE DIAGNOSES  Diagnosis: Hyperlipidemia, unspecified hyperlipidemia type  Assessment and Plan of Treatment: Take all medicine as ordered  Continue to take low fat, low cholesterol low sodium diet    Diagnosis: Type 2 diabetes mellitus without complication, with long-term current use of insulin  Assessment and Plan of Treatment: It is extremely important to follow a diabetic (consistent carbohydrates, LOW/NO ADDED SUGAR) diet and monitor your glucose (sugar) levels. You have an increased risk of complications, including wound infections, due to the diabetes.  1. Check your glucoses 3-4 times daily before meals and bedtime.   2. Keep a log of your glucose levels every day.   3. Make an appointment to meet with your primary care provider or endocrinologist within a week.   4. Take ALL of your medications as prescribed. Only hold medications for low glucose levels (< 80) or if you are symptomatic (dizzy, sweating, lightheaded).  5. Ideally, we would like all of the glucose levels to be between .   You may have been discharged on different medications than you were taking before. Your body reacts differently to the medications after surgery. Please continue to take the medications as prescribed and notify the office, nurse practitioner or your PCP if you have any concerns right away.

## 2020-08-25 NOTE — DISCHARGE NOTE PROVIDER - NSDCCPTREATMENT_GEN_ALL_CORE_FT
PRINCIPAL PROCEDURE  Procedure: CABG, 1 arterial and 2 venous  Findings and Treatment:       SECONDARY PROCEDURE  Procedure: Coronary endarterectomy  Findings and Treatment:

## 2020-08-25 NOTE — PROGRESS NOTE ADULT - PROBLEM SELECTOR PROBLEM 3
Type 2 diabetes mellitus without complication, with long-term current use of insulin

## 2020-08-25 NOTE — DISCHARGE NOTE PROVIDER - NSDCFUADDINST_GEN_ALL_CORE_FT
Please call the Cardiothoracic Surgery office at [phone] if you are experiencing any shortness of breath, chest pain, fevers or chills, drainage from the incisions, persistent nausea, vomiting or if you have any questions about your medications. If the symptoms are severe, call 911 and go to the nearest hospital. You can also call [phone] for an emergency [facility] ambulance, which will take you to the closest [facility].    If you need any assistance for making any appointments for a new consult or referral in any specialty, please call our [facility] Clinical Coordination Center at [phone].  Your Care Navigator Nurse Practitioner will be in touch to see you in your home within a few days from discharge. The Follow Your Heart program can help ensure you understand your medications, discharge instructions and answer any questions you may have at that time. They are also a great source to address concerns during the day and may be reached at [phone].

## 2020-08-25 NOTE — PROGRESS NOTE ADULT - ASSESSMENT
54 year old Male with PMH of DM type II (HA1c 6.9 on metformin), HLD, transferred from [facility] s/p cardiac cath which revealed mutivessel coronary artery disease.  On 8/20 pt underwent CABG x 3 (LIMA-LAD, SVG-OM, SVG-RPDA) with Coronary Endarterectomy (LAD-Vein Patch Angioplasty) on 8/20/20 with Dr. Plasencia.  Postoperative course remains uneventful.  8/24 Pacing wires removed
54 year old Male with PMH of DM type II (HA1c 6.9 on metformin), HLD, transferred from [facility] s/p cardiac cath which revealed mutivessel coronary artery disease.  On 8/20 pt underwent CABG x 3 (LIMA-LAD, SVG-OM, SVG-RPDA) with Coronary Endarterectomy (LAD-Vein Patch Angioplasty) on 8/20/20 with Dr. Plasencia.  Postoperative course remains uneventful.
54 year old Male with PMH of DM type II (HA1c 6.9 on metformin), HLD, transferred from [facility] s/p cardiac cath which revealed mutivessel coronary artery disease.  On 8/20 pt underwent CABG x 3 (LIMA-LAD, SVG-OM, SVG-RPDA) with Coronary Endarterectomy (LAD-Vein Patch Angioplasty) on 8/20/20 with Dr. Plasencia.  Postoperative course remains uneventful.
54 year old Male with PMH of DM type II (HA1c 6.9 on metformin), HLD, transferred from [facility] s/p cardiac cath which revealed mutivessel coronary artery disease.  On 8/20 pt underwent CABG x 3 (LIMA-LAD, SVG-OM, SVG-RPDA) with Coronary Endarterectomy (LAD-Vein Patch Angioplasty) on 8/20/20 with Dr. Plasencia.  Postoperative course remains uneventful.
54 year old Male with PMH of DM type II, HLD transferred from [facility] s/p cardiac cath which reveals Triple vessel disease.  On 8/20 pt underwent C3L with LAD endarterectomy.  Post op course uneventful.
54 year old Male with PMH of DM type II, HLD transferred from [facility] s/p cardiac cath which reveals Triple vessel disease.  On 8/20 pt underwent C3L with LAD endarterectomy.  Post op course uneventful.

## 2020-08-25 NOTE — DISCHARGE NOTE PROVIDER - HOSPITAL COURSE
"PSYCHIATRIC FOLLOW UP:    Reason for Admission: suicidal ideation and self harm behaviors--cutting forearm   Reason for Consult: legal hold  Requesting Physician: Damon Velarde M.D.  Psychiatric Supervising Attending: Dr. Flower M.D.   Legal Hold Status: discontinued     Subjective:  Patient is a 21 y.o. female being seen for psychiatric follow up. On interview, patient reports that she slept well overnight and has an improved appetite. She denies any medication side effects. She reports her mood as \"better.\" She denies any SI/HI/AVH. She reports benefit from the medications.     Review of Systems   Constitutional: Negative for chills and fever.   HENT: Negative for hearing loss and sore throat.    Eyes: Negative for blurred vision and double vision.   Respiratory: Negative for cough and shortness of breath.    Cardiovascular: Negative for chest pain and palpitations.   Gastrointestinal: Negative for diarrhea, nausea and vomiting.   Genitourinary: Negative for dysuria and urgency.   Musculoskeletal: Negative for myalgias and neck pain.   Skin: Negative for itching and rash.   Neurological: Negative for weakness and headaches.   Psychiatric/Behavioral: Negative for depression and suicidal ideas.       Psychiatric Examination:   Vitals: /71   Pulse 85   Temp 36.9 °C (98.4 °F) (Oral)   Resp 14   Ht 1.5 m (4' 11.06\")   Wt 68.3 kg (150 lb 9.2 oz)   LMP 12/07/2019   SpO2 96%   BMI 30.36 kg/m²   Musculoskeletal: No abnormal movements noted  Appearance: well-developed, well-nourished and fair hygiene, cooperative, engaged and good eye contact  Thoughts: No overt delusions noted and patient denies SI and HI, linear, coherent and organized  Speech: regular rate, rhythm, volume, tone, and syntax  Mood: \"better\"  Affect: euthymic and congruent with mood  SI/HI: Denies SI and HI  Alert/Oriented: alert and oriented  Memory: no gross impairment in immediate, recent, or remote memory  Fund of Knowledge: " adequate  Insight/Judgement into symptoms: fair  Neurological Testing: MMSE not performed during this encounter    Medications (currently prescribed at [facility]):  Current Facility-Administered Medications   Medication Dose Route Frequency Provider Last Rate Last Dose   • mirtazapine (REMERON) tablet 7.5 mg  7.5 mg Oral QHS Moo Walsh M.D.   7.5 mg at 12/19/19 2121   • sertraline (ZOLOFT) tablet 50 mg  50 mg Oral DAILY Moo Walsh M.D.   50 mg at 12/20/19 0612     No current outpatient medications on file.        Labs:                      Recent Labs     12/18/19  1437   METHADONE Negative   OPIATES Negative   CANNABINOID Positive*   AMPHUR Negative        Recent Results (from the past 72 hour(s))   Urine Drug Screen    Collection Time: 12/18/19  2:37 PM   Result Value Ref Range    Amphetamines Urine Negative Negative    Barbiturates Negative Negative    Benzodiazepines Negative Negative    Cocaine Metabolite Negative Negative    Methadone Negative Negative    Opiates Negative Negative    Oxycodone Negative Negative    Phencyclidine -Pcp Negative Negative    Propoxyphene Negative Negative    Cannabinoid Metab Positive (A) Negative   BETA-HCG QUALITATIVE URINE    Collection Time: 12/18/19  2:37 PM   Result Value Ref Range    Beta-Hcg Urine Negative Negative   POC BREATHALIZER    Collection Time: 12/18/19  2:50 PM   Result Value Ref Range    POC Breathalizer 0.002 0.00 - 0.01 Percent          Assessment  Patient is a 21 year old female with a long history of untreated depression in the setting of past trauma who presents with 2 months of worsening suicidal ideations. Patient has 1 prior suicide attempt, severe in nature. UDS positive for cannabinoids. Breathalyzer 0.002. Patient also reports interpersonal and relational problems. She has a long history of self harm behaviors. She also reports symptoms of panic and severe mood dysregulation. Patient should continue to be evaluated for personality pathology.  54 year old M with PMH of DM type II (HA1c 6.9 on metformin), HLD, transferred from [facility] s/p cardiac cath which revealed mutivessel coronary artery disease.  On 8/20 pt underwent CABG x 3 (LIMA-LAD, SVG-OM, SVG-RPDA) with Coronary Endarterectomy (LAD-Vein Patch Angioplasty) on 8/20/20 with Dr. Plasencia.  Postoperative course remains uneventful. Patient denies SI/HI today and reports improved mood, sleep and appetite. She will be discharging home with mom and will follow up at [facility] outpatient clinic.       Diagnosis:  -major depressive disorder with panic attacks  -unspecified trauma and stressor related disorder      Medical:  -none acute      Plan:  1. Legal hold: discontinued    2. Continue Zoloft 50 mg daily for mood, Continue Mirtazapine 7.5 mg QHS daily for mood, sleep and appetite. Prescriptions in chart.   3. No PRN medication at this time  4. UDS positive for cannabinoids   5. Discharge to home with mother  6. No collateral obtained at this time      Other:  Sitter: in place              Visitors: mother may visit              Phone calls: may have access to phone                Personal items (specify): per protocol         Thank you for the consult. Signing off.

## 2020-08-25 NOTE — DISCHARGE NOTE PROVIDER - NSDCMRMEDTOKEN_GEN_ALL_CORE_FT
aspirin 325 mg oral delayed release tablet: 1 tab(s) orally once a day  Lipitor 20 mg oral tablet: 1 tab(s) orally once a day  metFORMIN 1000 mg oral tablet: 1 tab(s) orally 2 times a day apixaban 2.5 mg oral tablet: 1 tab(s) orally 2 times a day  aspirin 81 mg oral delayed release tablet: 1 tab(s) orally once a day  atorvastatin 80 mg oral tablet: 1 tab(s) orally once a day (at bedtime)  furosemide 20 mg oral tablet: 1 tab(s) orally once a day  K-Tab 20 mEq oral tablet, extended release: 1 tab(s) orally once a day   melatonin 5 mg oral tablet: 1 tab(s) orally once a day (at bedtime), As needed, Insomnia  metFORMIN 1000 mg oral tablet: 1 tab(s) orally 2 times a day  metoprolol tartrate 50 mg oral tablet: 1 tab(s) orally 2 times a day  Percocet 5 mg-325 mg oral tablet: 1 tab(s) orally every 4 hours, As needed, Moderate Pain (4 - 6) MDD:6  senna oral tablet: 2 tab(s) orally once a day (at bedtime)

## 2020-08-25 NOTE — DISCHARGE NOTE PROVIDER - NSDCFUSCHEDAPPT_GEN_ALL_CORE_FT
ALLEN SLAUGHTER ; 09/30/2020 ; [facility] Cardio [address] [facility] [place] ; 10/28/2020 ; [facility] Endocrin [address] ALLEN SLAUGHTER ; 09/30/2020 ; [facility] Cardio [address] [facility] [place] ; 10/28/2020 ; [facility] Endocrin [address] ALLEN SLAUGHTER ; 09/30/2020 ; [facility] Cardio [address] [facility] [place] ; 10/28/2020 ; [facility] Endocrin [address]

## 2020-08-25 NOTE — DISCHARGE NOTE PROVIDER - NSDCFUADDAPPT_GEN_ALL_CORE_FT
Please follow up with Dr. Plasencia on    The cardiac surgery office is on the second floor of [facility].   Take the [address] ("G") elevators to 2 and make a left.   Walk down to the second hallway on your left (before the double doors).   Sign says Cardiovascular and Thoracic Surgery. Turn left and walk down the long hallway. Please follow up with Dr. Plasencia on Tuesday Sept 8th at 10:30AM.     The cardiac surgery office is on the second floor of [facility]. Please ask for directions at the .

## 2020-08-25 NOTE — PROGRESS NOTE ADULT - PROBLEM SELECTOR PLAN 3
Continue Lantus 15 units qhs, Pre meal Humalog 4 units tid and MISHEL with fingersticks ACHS.  BS goal .   DASH/consistent carb diabetic diet advised.   Consider Endocrine consult for strict postoperative BS control.
Continue Lantus 15 units qhs, Pre meal Humalog 4 units tid and MISHEL with fingersticks ACHS.  BS goal .   DASH/consistent carb diabetic diet advised.   Consider Endocrine consult for strict postoperative BS control.
metformin  improved glycemic control  DASH/consistent carb diabetic diet advised  Patient to follow up with his endocrinology outpatient
metformin  improved glycemic control  DASH/consistent carb diabetic diet advised.
Currently on Insulin gtt, will transition later this evening off gtt.  HgA1C 6.9, would benefit from Endocrine Consult
Insulin gtt off  Continue Lantus 15 uts qhs, Pre meal Humalog 4 tid and MISHEL ACHS

## 2020-08-26 ENCOUNTER — APPOINTMENT (OUTPATIENT)
Dept: CARE COORDINATION | Facility: HOME HEALTH | Age: 55
End: 2020-08-26
Payer: COMMERCIAL

## 2020-08-26 PROCEDURE — 99024 POSTOP FOLLOW-UP VISIT: CPT

## 2020-08-26 RX ORDER — ASPIRIN 81 MG/1
81 TABLET ORAL DAILY
Refills: 0 | Status: ACTIVE | COMMUNITY
Start: 2020-08-26

## 2020-08-26 NOTE — PHYSICAL EXAM
[FreeTextEntry1] : MSI, CT sites and SVG sites without erythema, drainage or warmth, with edges well approximated.  Sternum stable. BLE minimal edema

## 2020-08-26 NOTE — HISTORY OF PRESENT ILLNESS
[Spouse] : spouse [Home] : at home, [unfilled] , at the time of the visit. [Other Location: e.g. Home (Enter Location, City,State)___] : at [unfilled] [FreeTextEntry1] : 54M s/p CABG Dr. Plasencia.  Pt staying at summer home in [facility], presented to [facility] with CP, positive cath, transferred to [facility] for CABG [Verbal consent obtained from patient] : the patient, [unfilled]

## 2020-09-03 DIAGNOSIS — Z86.39 PERSONAL HISTORY OF OTHER ENDOCRINE, NUTRITIONAL AND METABOLIC DISEASE: ICD-10-CM

## 2020-09-03 DIAGNOSIS — I10 ESSENTIAL (PRIMARY) HYPERTENSION: ICD-10-CM

## 2020-09-08 ENCOUNTER — APPOINTMENT (OUTPATIENT)
Dept: CARDIOTHORACIC SURGERY | Facility: CLINIC | Age: 55
End: 2020-09-08
Payer: COMMERCIAL

## 2020-09-11 ENCOUNTER — APPOINTMENT (OUTPATIENT)
Dept: CARDIOTHORACIC SURGERY | Facility: CLINIC | Age: 55
End: 2020-09-11
Payer: COMMERCIAL

## 2020-09-11 VITALS
OXYGEN SATURATION: 97 % | SYSTOLIC BLOOD PRESSURE: 122 MMHG | WEIGHT: 192 LBS | RESPIRATION RATE: 16 BRPM | HEART RATE: 97 BPM | DIASTOLIC BLOOD PRESSURE: 81 MMHG

## 2020-09-11 DIAGNOSIS — Z82.49 FAMILY HISTORY OF ISCHEMIC HEART DISEASE AND OTHER DISEASES OF THE CIRCULATORY SYSTEM: ICD-10-CM

## 2020-09-11 DIAGNOSIS — Z80.1 FAMILY HISTORY OF MALIGNANT NEOPLASM OF TRACHEA, BRONCHUS AND LUNG: ICD-10-CM

## 2020-09-11 DIAGNOSIS — Z78.9 OTHER SPECIFIED HEALTH STATUS: ICD-10-CM

## 2020-09-11 PROCEDURE — 99024 POSTOP FOLLOW-UP VISIT: CPT

## 2020-09-11 RX ORDER — POTASSIUM CHLORIDE 1500 MG/1
20 TABLET, FILM COATED, EXTENDED RELEASE ORAL DAILY
Refills: 0 | Status: COMPLETED | COMMUNITY
Start: 2020-08-26 | End: 2020-09-11

## 2020-09-11 RX ORDER — AMIODARONE HYDROCHLORIDE 200 MG/1
200 TABLET ORAL
Refills: 0 | Status: COMPLETED | COMMUNITY
End: 2020-09-11

## 2020-09-11 RX ORDER — FUROSEMIDE 20 MG/1
20 TABLET ORAL DAILY
Refills: 0 | Status: COMPLETED | COMMUNITY
Start: 2020-08-26 | End: 2020-09-11

## 2020-09-17 ENCOUNTER — NON-APPOINTMENT (OUTPATIENT)
Age: 55
End: 2020-09-17

## 2020-09-24 ENCOUNTER — TRANSCRIPTION ENCOUNTER (OUTPATIENT)
Age: 55
End: 2020-09-24

## 2020-09-30 ENCOUNTER — NON-APPOINTMENT (OUTPATIENT)
Age: 55
End: 2020-09-30

## 2020-09-30 ENCOUNTER — APPOINTMENT (OUTPATIENT)
Dept: CARDIOLOGY | Facility: CLINIC | Age: 55
End: 2020-09-30
Payer: COMMERCIAL

## 2020-09-30 VITALS
WEIGHT: 192 LBS | DIASTOLIC BLOOD PRESSURE: 60 MMHG | HEIGHT: 70 IN | HEART RATE: 95 BPM | OXYGEN SATURATION: 98 % | SYSTOLIC BLOOD PRESSURE: 100 MMHG | BODY MASS INDEX: 27.49 KG/M2

## 2020-09-30 PROCEDURE — 99214 OFFICE O/P EST MOD 30 MIN: CPT | Mod: 25

## 2020-09-30 PROCEDURE — 0296T: CPT

## 2020-09-30 PROCEDURE — 93000 ELECTROCARDIOGRAM COMPLETE: CPT | Mod: 59

## 2020-10-01 NOTE — ADDENDUM
[FreeTextEntry1] : ? Eliquis was prescribed in due to the LAD endarterectomy \par Plan per CT surgery was a 6-month course of anticoagulation followed by dual antiplatelet therapy

## 2020-10-01 NOTE — ASSESSMENT
[FreeTextEntry1] : Progressive reduction in exercise tolerance \par Cardiac catheterization severe triple-vessel disease \par ischemic cardiomyopathy \par relative tachycardia \par history of diabetes hyperlipidemia\par \par Monitor applied \par Follow-up echocardiogram \par Refer to cardiac rehab \par Blood work requested screen LP(a) \par \par Daily aspirin therapy\par High intensity statin therapy transient anticoagulation? \par continue metoprolol \par Follow-up with primary physician regarding glucose control \par \par

## 2020-10-01 NOTE — PHYSICAL EXAM
[General Appearance - Well Developed] : well developed [Normal Appearance] : normal appearance [Well Groomed] : well groomed [General Appearance - Well Nourished] : well nourished [No Deformities] : no deformities [General Appearance - In No Acute Distress] : no acute distress [Normal Conjunctiva] : the conjunctiva exhibited no abnormalities [Eyelids - No Xanthelasma] : the eyelids demonstrated no xanthelasmas [Normal Oral Mucosa] : normal oral mucosa [No Oral Pallor] : no oral pallor [No Oral Cyanosis] : no oral cyanosis [Normal Jugular Venous A Waves Present] : normal jugular venous A waves present [Normal Jugular Venous V Waves Present] : normal jugular venous V waves present [No Jugular Venous Ríos A Waves] : no jugular venous ríos A waves [Heart Rate And Rhythm] : heart rate and rhythm were normal [Heart Sounds] : normal S1 and S2 [Murmurs] : no murmurs present [Respiration, Rhythm And Depth] : normal respiratory rhythm and effort [Exaggerated Use Of Accessory Muscles For Inspiration] : no accessory muscle use [Auscultation Breath Sounds / Voice Sounds] : lungs were clear to auscultation bilaterally [Abdomen Soft] : soft [Abdomen Tenderness] : non-tender [Abdomen Mass (___ Cm)] : no abdominal mass palpated [Abnormal Walk] : normal gait [Gait - Sufficient For Exercise Testing] : the gait was sufficient for exercise testing [Nail Clubbing] : no clubbing of the fingernails [Cyanosis, Localized] : no localized cyanosis [Petechial Hemorrhages (___cm)] : no petechial hemorrhages [Skin Color & Pigmentation] : normal skin color and pigmentation [] : no rash [No Venous Stasis] : no venous stasis [Skin Lesions] : no skin lesions [No Skin Ulcers] : no skin ulcer [No Xanthoma] : no  xanthoma was observed [Oriented To Time, Place, And Person] : oriented to person, place, and time [Affect] : the affect was normal [Mood] : the mood was normal [No Anxiety] : not feeling anxious [FreeTextEntry1] : sternooy site c/d/i

## 2020-10-01 NOTE — HISTORY OF PRESENT ILLNESS
[FreeTextEntry1] : ALLEN SLAUGHTER  is a 55 year old  M\par Referred by Dr. Plasencia\par \par Previously had hand reconstruction \par History of hyperlipidemia non-insulin-dependent diabetes \par Family history of cardiovascular disease \par There is no prior history of a clinical myocardial infarction\par There is no history of symptomatic congestive heart failure rheumatic heart disease or valvular disease.\par There is no history of symptomatic arrhythmias including atrial fibrillation.\par \par Has had chronic symptoms of being deconditioned \par Previously underwent cardiovascular evaluation in [facility] which was unremarkable \par \par Last month he had marked change in his symptoms reduction in exercise tolerance which essentially progressed to symptoms at rest \par Seen at [facility] he underwent angiography\par Demonstrated multivessel disease and transferred to [facility] \par Underwent bypass grafting, endarterectomy of left anterior descending artery three-vessel bypass LIMA to LAD saphenous vein graft to obtuse marginal saphenous vein graft to PDA endarterectomy of LAD August 20, 2020\par Discharged after 5 days \par Now off the Lasix therapy \par Review of hospital the indication for apixaban remains unclear will discuss with CT surgery \par Lives in [facility] he is now on the [place] \par \par Reviewed his home monitoring there has been minor weight loss his diastolic blood pressure readings are borderline his pulse rate is relatively elevated \par He reports being aware of his heart rate being elevated and can sense his heart beating \par There are symptoms of paresthesias \par \par There is no exertional chest pain, pressure or discomfort. \par There is no significant dyspnea on exertion or orthopnea. \par There is no syncope.\par \par inpatient echocardiogram was a suboptimal study which demonstrated wall motion abnormalities with low normal left ventricular function and no significant valvular heart disease \par cardiac catheterization demonstrated triple-vessel disease \par

## 2020-10-08 ENCOUNTER — APPOINTMENT (OUTPATIENT)
Dept: CARDIOLOGY | Facility: CLINIC | Age: 55
End: 2020-10-08
Payer: COMMERCIAL

## 2020-10-08 PROCEDURE — 93306 TTE W/DOPPLER COMPLETE: CPT

## 2020-10-14 PROCEDURE — 0298T: CPT

## 2020-10-17 ENCOUNTER — TRANSCRIPTION ENCOUNTER (OUTPATIENT)
Age: 55
End: 2020-10-17

## 2020-10-19 ENCOUNTER — TRANSCRIPTION ENCOUNTER (OUTPATIENT)
Age: 55
End: 2020-10-19

## 2020-10-19 RX ORDER — METOPROLOL TARTRATE 50 MG/1
50 TABLET, FILM COATED ORAL
Qty: 15 | Refills: 0 | Status: DISCONTINUED | COMMUNITY
Start: 2020-10-17 | End: 2020-10-19

## 2020-10-22 ENCOUNTER — TRANSCRIPTION ENCOUNTER (OUTPATIENT)
Age: 55
End: 2020-10-22

## 2020-10-28 ENCOUNTER — APPOINTMENT (OUTPATIENT)
Dept: ENDOCRINOLOGY | Facility: CLINIC | Age: 55
End: 2020-10-28

## 2020-11-11 ENCOUNTER — APPOINTMENT (OUTPATIENT)
Dept: CARDIOLOGY | Facility: CLINIC | Age: 55
End: 2020-11-11
Payer: COMMERCIAL

## 2020-11-11 VITALS
WEIGHT: 194 LBS | BODY MASS INDEX: 27.77 KG/M2 | HEIGHT: 70 IN | DIASTOLIC BLOOD PRESSURE: 80 MMHG | SYSTOLIC BLOOD PRESSURE: 112 MMHG | OXYGEN SATURATION: 98 % | HEART RATE: 89 BPM

## 2020-11-11 DIAGNOSIS — Z00.00 ENCOUNTER FOR GENERAL ADULT MEDICAL EXAMINATION W/OUT ABNORMAL FINDINGS: ICD-10-CM

## 2020-11-11 PROCEDURE — 99214 OFFICE O/P EST MOD 30 MIN: CPT

## 2020-11-11 PROCEDURE — 99072 ADDL SUPL MATRL&STAF TM PHE: CPT

## 2020-11-11 RX ORDER — OXYCODONE AND ACETAMINOPHEN 5; 325 MG/1; MG/1
5-325 TABLET ORAL
Refills: 0 | Status: DISCONTINUED | COMMUNITY
Start: 2020-08-26 | End: 2020-11-11

## 2020-11-11 RX ORDER — CHLORHEXIDINE GLUCONATE 4 %
5 LIQUID (ML) TOPICAL
Refills: 0 | Status: DISCONTINUED | COMMUNITY
Start: 2020-08-26 | End: 2020-11-11

## 2020-11-12 NOTE — REASON FOR VISIT
[Coronary Artery Disease] : coronary artery disease [CABG Follow-up] : bypass graft [Follow-Up - Clinic] : a clinic follow-up of

## 2020-11-12 NOTE — HISTORY OF PRESENT ILLNESS
[FreeTextEntry1] : ALLEN SLAUGHTER  is a 55 year old  M\par Referred by Dr. Plasencia\par \par History of hyperlipidemia non-insulin-dependent diabetes \par Family history of cardiovascular disease \par There is no history of symptomatic congestive heart failure rheumatic heart disease or valvular disease.\par There is no history of symptomatic arrhythmias including atrial fibrillation.\par \par Has had chronic symptoms of being deconditioned \par Previously underwent cardiovascular evaluation in [facility] which was unremarkable \par \par Recent marked change in his symptoms reduction in exercise tolerance which essentially progressed to symptoms at rest \par Seen at [facility] he underwent angiography\par Demonstrated multivessel disease and transferred to [facility] \par Underwent bypass grafting, endarterectomy of left anterior descending artery three-vessel bypass LIMA to LAD saphenous vein graft to obtuse marginal saphenous vein  \par graft to PDA endarterectomy of LAD August 20, 2020\par Discharged after 5 days \par Now off the Lasix therapy \par \par There are symptoms of numbness with decreased dexterity \par \par Monitor demonstrates sinus rhythm, premature beats, no atrial fibrillation \par echocardiogram demonstrates normal left ventricular function \par blood work October 2020 has C-reactive protein of 6.5 his wife reports that he did have a prior diagnosis of rheumatoid arthritis questionable JRA LP(a) within normal  \par limits creatinine of 1.0 total cholesterol 107 HDL 34 LDL 52 hemoglobin of 11.9 hemoglobin A1c 6.2\par \par There is no exertional chest pain, pressure or discomfort. \par There is no significant dyspnea on exertion or orthopnea. \par There is no syncope.\par \par inpatient echocardiogram was a suboptimal study which demonstrated wall motion abnormalities with low normal left ventricular function and no significant valvular  \par heart disease \par cardiac catheterization demonstrated triple-vessel disease \par

## 2020-11-12 NOTE — REVIEW OF SYSTEMS
[Negative] : Heme/Lymph [see HPI] : see HPI [Numbness (Hypesthesia)] : numbness [Tingling (Paresthesia)] : tingling

## 2020-11-12 NOTE — ASSESSMENT
[FreeTextEntry1] : \par Progressive reduction in exercise tolerance \par Cardiac catheterization severe triple-vessel disease \par ischemic cardiomyopathy, non specific BNP \par relative tachycardia \par history of diabetes \par normal lpa\par elevated crp\par elevated ca++\par dyslipidemia with low HDL\par \par normal left ventricular function \par no significant dysrhythmias \par Expect improvement in heart rate with conditioning\par \par Reviewed his recent echocardiogram and monitor \par referred to cardiac rehab requests [place]?\par Rehab recommends stress test a limited symptom limited stress test on medications will be performed\par \par Daily aspirin therapy\par High intensity statin therapy \par continue metoprolol \par Follow-up with primary physician regarding glucose control \par \par Neurology evaluation.  Office information for Dr. Henderson provided re pareesthesias, weakness etc\par Discussed his case with CT surgery.  Will complete course of anticoagulation x 3-6 mos and then transition to dual antiplatelet therapy.\par \par

## 2020-11-12 NOTE — PHYSICAL EXAM
[General Appearance - Well Developed] : well developed [Normal Appearance] : normal appearance [Well Groomed] : well groomed [General Appearance - Well Nourished] : well nourished [No Deformities] : no deformities [General Appearance - In No Acute Distress] : no acute distress [Normal Conjunctiva] : the conjunctiva exhibited no abnormalities [Eyelids - No Xanthelasma] : the eyelids demonstrated no xanthelasmas [Normal Oral Mucosa] : normal oral mucosa [No Oral Pallor] : no oral pallor [No Oral Cyanosis] : no oral cyanosis [Normal Jugular Venous A Waves Present] : normal jugular venous A waves present [Normal Jugular Venous V Waves Present] : normal jugular venous V waves present [No Jugular Venous Ríos A Waves] : no jugular venous ríos A waves [Respiration, Rhythm And Depth] : normal respiratory rhythm and effort [Exaggerated Use Of Accessory Muscles For Inspiration] : no accessory muscle use [Auscultation Breath Sounds / Voice Sounds] : lungs were clear to auscultation bilaterally [Heart Rate And Rhythm] : heart rate and rhythm were normal [Heart Sounds] : normal S1 and S2 [Murmurs] : no murmurs present [Abdomen Soft] : soft [Abdomen Tenderness] : non-tender [Abdomen Mass (___ Cm)] : no abdominal mass palpated [Abnormal Walk] : normal gait [Gait - Sufficient For Exercise Testing] : the gait was sufficient for exercise testing [Nail Clubbing] : no clubbing of the fingernails [Cyanosis, Localized] : no localized cyanosis [Petechial Hemorrhages (___cm)] : no petechial hemorrhages [Skin Color & Pigmentation] : normal skin color and pigmentation [] : no rash [No Venous Stasis] : no venous stasis [Skin Lesions] : no skin lesions [No Skin Ulcers] : no skin ulcer [No Xanthoma] : no  xanthoma was observed [Oriented To Time, Place, And Person] : oriented to person, place, and time [Affect] : the affect was normal [Mood] : the mood was normal [No Anxiety] : not feeling anxious [FreeTextEntry1] : sternooy site c/d/i

## 2020-11-19 ENCOUNTER — APPOINTMENT (OUTPATIENT)
Dept: CARDIOLOGY | Facility: CLINIC | Age: 55
End: 2020-11-19
Payer: COMMERCIAL

## 2020-11-19 PROCEDURE — 93015 CV STRESS TEST SUPVJ I&R: CPT

## 2020-12-04 ENCOUNTER — TRANSCRIPTION ENCOUNTER (OUTPATIENT)
Age: 55
End: 2020-12-04

## 2020-12-04 ENCOUNTER — NON-APPOINTMENT (OUTPATIENT)
Age: 55
End: 2020-12-04

## 2020-12-04 ENCOUNTER — EMERGENCY (EMERGENCY)
Facility: HOSPITAL | Age: 55
LOS: 1 days | End: 2020-12-04
Admitting: EMERGENCY MEDICINE
Payer: COMMERCIAL

## 2020-12-04 PROCEDURE — 99285 EMERGENCY DEPT VISIT HI MDM: CPT

## 2020-12-04 PROCEDURE — 74176 CT ABD & PELVIS W/O CONTRAST: CPT | Mod: 26

## 2020-12-21 ENCOUNTER — NON-APPOINTMENT (OUTPATIENT)
Age: 55
End: 2020-12-21

## 2021-01-06 ENCOUNTER — TRANSCRIPTION ENCOUNTER (OUTPATIENT)
Age: 56
End: 2021-01-06

## 2021-01-19 ENCOUNTER — APPOINTMENT (OUTPATIENT)
Dept: CARDIOLOGY | Facility: CLINIC | Age: 56
End: 2021-01-19
Payer: COMMERCIAL

## 2021-01-19 VITALS
RESPIRATION RATE: 16 BRPM | HEIGHT: 70 IN | SYSTOLIC BLOOD PRESSURE: 122 MMHG | HEART RATE: 78 BPM | TEMPERATURE: 97.6 F | OXYGEN SATURATION: 99 % | WEIGHT: 191 LBS | DIASTOLIC BLOOD PRESSURE: 74 MMHG | BODY MASS INDEX: 27.35 KG/M2

## 2021-01-19 DIAGNOSIS — R79.89 OTHER SPECIFIED ABNORMAL FINDINGS OF BLOOD CHEMISTRY: ICD-10-CM

## 2021-01-19 DIAGNOSIS — R07.89 OTHER CHEST PAIN: ICD-10-CM

## 2021-01-19 PROCEDURE — 99214 OFFICE O/P EST MOD 30 MIN: CPT

## 2021-01-19 PROCEDURE — 99072 ADDL SUPL MATRL&STAF TM PHE: CPT

## 2021-01-19 RX ORDER — METOPROLOL TARTRATE 50 MG/1
50 TABLET, FILM COATED ORAL
Refills: 0 | Status: DISCONTINUED | COMMUNITY
Start: 2020-08-26 | End: 2021-01-19

## 2021-01-19 RX ORDER — AMLODIPINE BESYLATE 5 MG/1
5 TABLET ORAL
Refills: 0 | Status: DISCONTINUED | COMMUNITY
End: 2021-01-19

## 2021-01-19 RX ORDER — APIXABAN 2.5 MG/1
2.5 TABLET, FILM COATED ORAL
Qty: 180 | Refills: 3 | Status: DISCONTINUED | COMMUNITY
Start: 2020-08-26 | End: 2021-01-19

## 2021-01-23 NOTE — ASSESSMENT
[FreeTextEntry1] : Progressive reduction in exercise tolerance \par Cardiac catheterization severe triple-vessel disease \par ischemic cardiomyopathy, non specific BNP \par relative tachycardia \par history of diabetes \par normal lpa\par elevated crp\par elevated ca++\par dyslipidemia with low HDL\par \par normal left ventricular function \par no significant dysrhythmias \par Expect improvement in heart rate with conditioning\par \par Outside ER record has been requested.  \par Outside blood work has been requested.  \par Outside neurology record has been requested.  \par Will change Eliquis to Plavix when he completes his prescription. \par On my exam his blood pressure is 108/74.  \par Follow-up blood work has been requested.  \par Continue regular aerobic exercise program.  \par He no longer takes amlodipine.  \par Follow-up C-reactive protein, BNP, lipid profile, CBC, A1c.  \par I have his have emphasized the importance of establishing primary care for local health maintenance and management of noncardiovascular issues\par \par Reviewed his recent echocardiogram and monitor \par cardiac Rehab \par \par DAPT therapy\par High intensity statin therapy \par continue metoprolol \par Follow-up with primary physician regarding glucose control

## 2021-01-23 NOTE — PHYSICAL EXAM
[General Appearance - Well Developed] : well developed [Normal Appearance] : normal appearance [Well Groomed] : well groomed [General Appearance - Well Nourished] : well nourished [No Deformities] : no deformities [General Appearance - In No Acute Distress] : no acute distress [Normal Conjunctiva] : the conjunctiva exhibited no abnormalities [Eyelids - No Xanthelasma] : the eyelids demonstrated no xanthelasmas [Normal Oral Mucosa] : normal oral mucosa [No Oral Pallor] : no oral pallor [No Oral Cyanosis] : no oral cyanosis [Normal Jugular Venous A Waves Present] : normal jugular venous A waves present [Normal Jugular Venous V Waves Present] : normal jugular venous V waves present [No Jugular Venous Ríos A Waves] : no jugular venous ríos A waves [Respiration, Rhythm And Depth] : normal respiratory rhythm and effort [Exaggerated Use Of Accessory Muscles For Inspiration] : no accessory muscle use [Auscultation Breath Sounds / Voice Sounds] : lungs were clear to auscultation bilaterally [Heart Rate And Rhythm] : heart rate and rhythm were normal [Heart Sounds] : normal S1 and S2 [Murmurs] : no murmurs present [Abdomen Tenderness] : non-tender [Abdomen Soft] : soft [Abdomen Mass (___ Cm)] : no abdominal mass palpated [Abnormal Walk] : normal gait [Gait - Sufficient For Exercise Testing] : the gait was sufficient for exercise testing [Nail Clubbing] : no clubbing of the fingernails [Cyanosis, Localized] : no localized cyanosis [Petechial Hemorrhages (___cm)] : no petechial hemorrhages [Skin Color & Pigmentation] : normal skin color and pigmentation [] : no rash [No Venous Stasis] : no venous stasis [Skin Lesions] : no skin lesions [No Skin Ulcers] : no skin ulcer [No Xanthoma] : no  xanthoma was observed [Oriented To Time, Place, And Person] : oriented to person, place, and time [Affect] : the affect was normal [Mood] : the mood was normal [No Anxiety] : not feeling anxious [FreeTextEntry1] : sternooy site c/d/i

## 2021-01-23 NOTE — HISTORY OF PRESENT ILLNESS
[FreeTextEntry1] : ALLEN SLAUGHTER  is a 55 year old  M\par \par \par Referred by Dr. Plasencia\par \par History of hyperlipidemia non-insulin-dependent diabetes \par Family history of cardiovascular disease \par There is no history of symptomatic congestive heart failure rheumatic heart disease or valvular disease.\par There is no history of symptomatic arrhythmias including atrial fibrillation.\par \par Has had chronic symptoms of being deconditioned \par Previously underwent cardiovascular evaluation in [facility] which was unremarkable \par \par Recent marked change in his symptoms reduction in exercise tolerance which essentially progressed to symptoms at rest \par Seen at [facility] he underwent angiography\par Demonstrated multivessel disease and transferred to [facility] \par Underwent bypass grafting, endarterectomy of left anterior descending artery three-vessel bypass LIMA to LAD saphenous vein graft to obtuse marginal saphenous vein graft to PDA endarterectomy of LAD August 20, 2020\par Discharged after 5 days \par \par There are symptoms of numbness with decreased dexterity \par In the interim he had a stress test in November he exercised 6 minutes.  Submaximal.  Prerehab.  No ischemia.  \par In the interim he had an emergency room visit for atypical chest pain\par There is no exertional chest pain, pressure or discomfort. \par There is no significant dyspnea on exertion or orthopnea. \par There is no syncope.\par \par He started cardiac rehab [place] but this is subsequently closed.  \par He remains active riding his bike at home and outdoors the weather permits.  \par He had episode of atypical chest discomfort.  Worse with movement.  Described pain as excruciating.  He was seen in the emergency room.  He was given topical analgesics.  He also saw neurology.  \par He had blood work performed.  \par \par Monitor demonstrates sinus rhythm, premature beats, no atrial fibrillation \par echocardiogram demonstrates normal left ventricular function \par blood work October 2020 has C-reactive protein of 6.5 his wife reports that he did have a prior diagnosis of rheumatoid arthritis questionable JRA LP(a) within normal limits creatinine of 1.0 total cholesterol 107 HDL 34 LDL 52 hemoglobin of 11.9 hemoglobin A1c 6.2\par inpatient echocardiogram was a suboptimal study which demonstrated wall motion abnormalities with low normal left ventricular function and no significant valvular \par heart disease \par cardiac catheterization demonstrated triple-vessel disease \par \par

## 2021-04-09 ENCOUNTER — TRANSCRIPTION ENCOUNTER (OUTPATIENT)
Age: 56
End: 2021-04-09

## 2021-04-20 ENCOUNTER — APPOINTMENT (OUTPATIENT)
Dept: CARDIOLOGY | Facility: CLINIC | Age: 56
End: 2021-04-20
Payer: COMMERCIAL

## 2021-04-20 VITALS
DIASTOLIC BLOOD PRESSURE: 64 MMHG | WEIGHT: 192 LBS | BODY MASS INDEX: 27.49 KG/M2 | HEART RATE: 75 BPM | HEIGHT: 70 IN | OXYGEN SATURATION: 100 % | SYSTOLIC BLOOD PRESSURE: 122 MMHG

## 2021-04-20 PROCEDURE — 99072 ADDL SUPL MATRL&STAF TM PHE: CPT

## 2021-04-20 PROCEDURE — 99213 OFFICE O/P EST LOW 20 MIN: CPT

## 2021-04-28 NOTE — PHYSICAL EXAM
[General Appearance - Well Developed] : well developed [Normal Appearance] : normal appearance [Well Groomed] : well groomed [General Appearance - Well Nourished] : well nourished [No Deformities] : no deformities [Normal Conjunctiva] : the conjunctiva exhibited no abnormalities [General Appearance - In No Acute Distress] : no acute distress [Eyelids - No Xanthelasma] : the eyelids demonstrated no xanthelasmas [Normal Oral Mucosa] : normal oral mucosa [No Oral Pallor] : no oral pallor [No Oral Cyanosis] : no oral cyanosis [Normal Jugular Venous A Waves Present] : normal jugular venous A waves present [Normal Jugular Venous V Waves Present] : normal jugular venous V waves present [No Jugular Venous Ríos A Waves] : no jugular venous ríos A waves [Respiration, Rhythm And Depth] : normal respiratory rhythm and effort [Exaggerated Use Of Accessory Muscles For Inspiration] : no accessory muscle use [Auscultation Breath Sounds / Voice Sounds] : lungs were clear to auscultation bilaterally [Heart Rate And Rhythm] : heart rate and rhythm were normal [Heart Sounds] : normal S1 and S2 [Murmurs] : no murmurs present [Abdomen Soft] : soft [Abdomen Tenderness] : non-tender [Abdomen Mass (___ Cm)] : no abdominal mass palpated [Abnormal Walk] : normal gait [Gait - Sufficient For Exercise Testing] : the gait was sufficient for exercise testing [Nail Clubbing] : no clubbing of the fingernails [Cyanosis, Localized] : no localized cyanosis [Petechial Hemorrhages (___cm)] : no petechial hemorrhages [Skin Color & Pigmentation] : normal skin color and pigmentation [] : no rash [No Venous Stasis] : no venous stasis [Skin Lesions] : no skin lesions [No Skin Ulcers] : no skin ulcer [No Xanthoma] : no  xanthoma was observed [Oriented To Time, Place, And Person] : oriented to person, place, and time [Affect] : the affect was normal [Mood] : the mood was normal [No Anxiety] : not feeling anxious [FreeTextEntry1] : sternooy site c/d/i

## 2021-04-28 NOTE — ASSESSMENT
[FreeTextEntry1] : \par Progressive reduction in exercise tolerance \par Cardiac catheterization severe triple-vessel disease \par ischemic cardiomyopathy, non specific BNP \par relative tachycardia, improved \par history of diabetes \par normal lpa\par elevated crp, improved\par elevated ca++, improved\par dyslipidemia with low HDL\par \par normal left ventricular function \par no significant dysrhythmias \par Expect improvement in heart rate with conditioning\par \par s/p NOAC\par Now on DAPT\par Continue regular aerobic exercise program.  \par Restart cardiac Rehab \par \par DAPT therapy\par High intensity statin therapy \par continue metoprolol \par Follow-up with primary physician regarding glucose control \par Primary care for local health maintenance and management of noncardiovascular issues\par \par Recent blood work has been reviewed \par Follow-up stress test will be scheduled at next office visit sooner if he has any new symptoms\par

## 2021-04-28 NOTE — HISTORY OF PRESENT ILLNESS
[FreeTextEntry1] : ALLEN SLAUGHTER  is a 55 year old  M\par \par Referred by Dr. Plasencia\par \par History of hyperlipidemia non-insulin-dependent diabetes \par Family history of cardiovascular disease \par There is no history of symptomatic congestive heart failure rheumatic heart disease or valvular disease.\par There is no history of symptomatic arrhythmias including atrial fibrillation.\par \par Has had chronic symptoms of being deconditioned \par Previously underwent cardiovascular evaluation in [facility] which was unremarkable \par \par Recent marked change in his symptoms reduction in exercise tolerance which essentially progressed to symptoms at rest \par Seen at [facility] he underwent angiography\par Demonstrated multivessel disease and transferred to [facility] \par Underwent bypass grafting, endarterectomy of left anterior descending artery three-vessel bypass LIMA to LAD saphenous vein graft to obtuse marginal saphenous vein graft to PDA endarterectomy of LAD August 20, 2020\par Discharged after 5 days \par \par There is no exertional chest pain, pressure or discomfort. \par There is no significant dyspnea on exertion or orthopnea. \par There is no syncope.\par He started cardiac rehab [place] but this is subsequently closed.  \par He remains active riding his bike at home \par Cardiac rehab was deferred he plans to restart next month he does ride a bike up to 30 minutes\par he notes his heart rate is lower \par \par April 2021 potassium 4.4 total cholesterol 136 LDL 72 A1c 6.2 LP(a) within normal limits hemoglobin 11.9 creatinine 1.2\par Monitor demonstrates sinus rhythm, premature beats, no atrial fibrillation \par echocardiogram demonstrates normal left ventricular function \par cardiac catheterization demonstrated triple-vessel disease

## 2021-05-18 ENCOUNTER — TRANSCRIPTION ENCOUNTER (OUTPATIENT)
Age: 56
End: 2021-05-18

## 2021-05-18 ENCOUNTER — NON-APPOINTMENT (OUTPATIENT)
Age: 56
End: 2021-05-18

## 2021-07-12 ENCOUNTER — TRANSCRIPTION ENCOUNTER (OUTPATIENT)
Age: 56
End: 2021-07-12

## 2021-08-17 ENCOUNTER — NON-APPOINTMENT (OUTPATIENT)
Age: 56
End: 2021-08-17

## 2021-08-17 ENCOUNTER — APPOINTMENT (OUTPATIENT)
Dept: CARDIOLOGY | Facility: CLINIC | Age: 56
End: 2021-08-17
Payer: COMMERCIAL

## 2021-08-17 VITALS
TEMPERATURE: 97.7 F | DIASTOLIC BLOOD PRESSURE: 80 MMHG | OXYGEN SATURATION: 99 % | WEIGHT: 190 LBS | HEIGHT: 70 IN | SYSTOLIC BLOOD PRESSURE: 118 MMHG | HEART RATE: 87 BPM | BODY MASS INDEX: 27.2 KG/M2

## 2021-08-17 PROCEDURE — 99215 OFFICE O/P EST HI 40 MIN: CPT

## 2021-08-17 PROCEDURE — 93000 ELECTROCARDIOGRAM COMPLETE: CPT

## 2021-08-22 NOTE — HISTORY OF PRESENT ILLNESS
[FreeTextEntry1] : ALLEN SLAUGHTER  is a 55 year old  M\par \par Referred by Dr. Plasencia\par \par History of hyperlipidemia non-insulin-dependent diabetes \par Family history of cardiovascular disease \par There is no history of symptomatic congestive heart failure rheumatic heart disease or valvular disease.\par There is no history of symptomatic arrhythmias including atrial fibrillation.\par \par Has had chronic symptoms of being deconditioned \par Previously underwent cardiovascular evaluation in [facility] which was unremarkable \par \par Recent marked change in his symptoms reduction in exercise tolerance which essentially progressed to symptoms at rest \par Seen at [facility], angiography multivessel disease and transferred to [facility] \par Underwent bypass grafting, endarterectomy of left anterior descending artery three-vessel bypass LIMA to LAD saphenous vein graft to obtuse marginal saphenous vein graft to PDA endarterectomy of LAD August 20, 2020\par Discharged after 5 days \par \par There is no exertional chest pain, pressure or discomfort. \par There is no significant dyspnea on exertion or orthopnea. \par There is no syncope.\par \par he is now back in [facility] \par he walks to work he is no longer doing cardiac rehab \par he started physical therapy for disabling lower back pain \par recently he was walking [place] the beach and had significant fatigue and dyspnea \par he needed to stop multiple times \par he reports a drop in his energy \par \par Today EKG demonstrates normal sinus rhythm with an incomplete right bundle branch block \par April 2021 potassium 4.4 total cholesterol 136 LDL 72 A1c 6.2 LP(a) within normal limits hemoglobin 11.9 creatinine 1.2\par Monitor demonstrates sinus rhythm, premature beats, no atrial fibrillation \par echocardiogram demonstrates normal left ventricular function \par cardiac catheterization demonstrated triple-vessel disease \par \par

## 2021-08-22 NOTE — ASSESSMENT
[FreeTextEntry1] : follow-up blood work \par nuclear stress test to rule out recurrent ischemic heart disease \par instructed to notify our office if any change in his symptoms\par \par Progressive reduction in exercise tolerance \par Cardiac catheterization severe triple-vessel disease \par ischemic cardiomyopathy, non specific BNP \par relative tachycardia, improved \par history of diabetes \par normal lpa\par elevated crp, improved\par elevated ca++, improved\par dyslipidemia with low HDL\par \par s/p NOAC\par Now on DAPT\par \par High intensity statin therapy \par continue metoprolol \par Primary care for local health maintenance and management of noncardiovascular issues\par

## 2021-09-03 ENCOUNTER — APPOINTMENT (OUTPATIENT)
Dept: CARDIOLOGY | Facility: CLINIC | Age: 56
End: 2021-09-03
Payer: COMMERCIAL

## 2021-09-03 ENCOUNTER — NON-APPOINTMENT (OUTPATIENT)
Age: 56
End: 2021-09-03

## 2021-09-03 DIAGNOSIS — R06.00 DYSPNEA, UNSPECIFIED: ICD-10-CM

## 2021-09-03 DIAGNOSIS — R94.39 ABNORMAL RESULT OF OTHER CARDIOVASCULAR FUNCTION STUDY: ICD-10-CM

## 2021-09-03 PROCEDURE — A9502: CPT

## 2021-09-03 PROCEDURE — 78452 HT MUSCLE IMAGE SPECT MULT: CPT

## 2021-09-03 PROCEDURE — 93015 CV STRESS TEST SUPVJ I&R: CPT

## 2021-09-06 ENCOUNTER — TRANSCRIPTION ENCOUNTER (OUTPATIENT)
Age: 56
End: 2021-09-06

## 2021-09-09 ENCOUNTER — TRANSCRIPTION ENCOUNTER (OUTPATIENT)
Age: 56
End: 2021-09-09

## 2021-09-10 ENCOUNTER — NON-APPOINTMENT (OUTPATIENT)
Age: 56
End: 2021-09-10

## 2021-09-13 ENCOUNTER — LABORATORY RESULT (OUTPATIENT)
Age: 56
End: 2021-09-13

## 2021-09-14 ENCOUNTER — NON-APPOINTMENT (OUTPATIENT)
Age: 56
End: 2021-09-14

## 2021-09-14 LAB
ALBUMIN SERPL ELPH-MCNC: 5.1 G/DL
ALP BLD-CCNC: 95 U/L
ALT SERPL-CCNC: 14 U/L
ANION GAP SERPL CALC-SCNC: 20 MMOL/L
AST SERPL-CCNC: 16 U/L
BASOPHILS # BLD AUTO: 0.06 K/UL
BASOPHILS NFR BLD AUTO: 0.5 %
BILIRUB SERPL-MCNC: 0.4 MG/DL
BUN SERPL-MCNC: 21 MG/DL
CALCIUM SERPL-MCNC: 10.4 MG/DL
CHLORIDE SERPL-SCNC: 100 MMOL/L
CHOLEST SERPL-MCNC: 112 MG/DL
CK SERPL-CCNC: 74 U/L
CO2 SERPL-SCNC: 24 MMOL/L
CREAT SERPL-MCNC: 1.37 MG/DL
CRP SERPL HS-MCNC: 5.16 MG/L
EOSINOPHIL # BLD AUTO: 0.63 K/UL
EOSINOPHIL NFR BLD AUTO: 5.6 %
ESTIMATED AVERAGE GLUCOSE: 140 MG/DL
GLUCOSE SERPL-MCNC: 115 MG/DL
HBA1C MFR BLD HPLC: 6.5 %
HCT VFR BLD CALC: 37.7 %
HDLC SERPL-MCNC: 37 MG/DL
HGB BLD-MCNC: 11.6 G/DL
IMM GRANULOCYTES NFR BLD AUTO: 0.3 %
LDLC SERPL CALC-MCNC: 48 MG/DL
LYMPHOCYTES # BLD AUTO: 2.32 K/UL
LYMPHOCYTES NFR BLD AUTO: 20.6 %
MAN DIFF?: NORMAL
MCHC RBC-ENTMCNC: 26.4 PG
MCHC RBC-ENTMCNC: 30.8 GM/DL
MCV RBC AUTO: 85.9 FL
MONOCYTES # BLD AUTO: 0.67 K/UL
MONOCYTES NFR BLD AUTO: 6 %
NEUTROPHILS # BLD AUTO: 7.53 K/UL
NEUTROPHILS NFR BLD AUTO: 67 %
NONHDLC SERPL-MCNC: 75 MG/DL
NT-PROBNP SERPL-MCNC: 155 PG/ML
PLATELET # BLD AUTO: 370 K/UL
POTASSIUM SERPL-SCNC: 4.6 MMOL/L
PROT SERPL-MCNC: 7.4 G/DL
RBC # BLD: 4.39 M/UL
RBC # FLD: 15.4 %
SODIUM SERPL-SCNC: 144 MMOL/L
TRIGL SERPL-MCNC: 133 MG/DL
TSH SERPL-ACNC: 3.06 UIU/ML
WBC # FLD AUTO: 11.24 K/UL

## 2021-09-16 ENCOUNTER — OUTPATIENT (OUTPATIENT)
Dept: OUTPATIENT SERVICES | Facility: HOSPITAL | Age: 56
LOS: 1 days | End: 2021-09-16
Payer: COMMERCIAL

## 2021-09-16 PROCEDURE — 93010 ELECTROCARDIOGRAM REPORT: CPT

## 2021-09-16 PROCEDURE — 93459 L HRT ART/GRFT ANGIO: CPT | Mod: 26

## 2021-09-20 ENCOUNTER — LABORATORY RESULT (OUTPATIENT)
Age: 56
End: 2021-09-20

## 2021-09-20 ENCOUNTER — APPOINTMENT (OUTPATIENT)
Dept: CARDIOLOGY | Facility: CLINIC | Age: 56
End: 2021-09-20
Payer: COMMERCIAL

## 2021-09-20 PROCEDURE — 99441: CPT

## 2021-09-21 LAB — SARS-COV-2 N GENE NPH QL NAA+PROBE: NOT DETECTED

## 2021-09-22 ENCOUNTER — TRANSCRIPTION ENCOUNTER (OUTPATIENT)
Age: 56
End: 2021-09-22

## 2021-09-22 ENCOUNTER — INPATIENT (INPATIENT)
Facility: HOSPITAL | Age: 56
LOS: 0 days | Discharge: HOME | DRG: 247 | End: 2021-09-23
Attending: INTERNAL MEDICINE | Admitting: INTERNAL MEDICINE
Payer: COMMERCIAL

## 2021-09-22 VITALS
RESPIRATION RATE: 16 BRPM | WEIGHT: 190.92 LBS | HEIGHT: 70 IN | SYSTOLIC BLOOD PRESSURE: 177 MMHG | TEMPERATURE: 98 F | OXYGEN SATURATION: 98 % | HEART RATE: 63 BPM | DIASTOLIC BLOOD PRESSURE: 98 MMHG

## 2021-09-22 DIAGNOSIS — I25.10 ATHEROSCLEROTIC HEART DISEASE OF NATIVE CORONARY ARTERY WITHOUT ANGINA PECTORIS: ICD-10-CM

## 2021-09-22 LAB
A1C WITH ESTIMATED AVERAGE GLUCOSE RESULT: 6.2 % — HIGH (ref 4–5.6)
ALBUMIN SERPL ELPH-MCNC: 4.6 G/DL — SIGNIFICANT CHANGE UP (ref 3.3–5.2)
ALP SERPL-CCNC: 91 U/L — SIGNIFICANT CHANGE UP (ref 40–120)
ALT FLD-CCNC: 12 U/L — SIGNIFICANT CHANGE UP
ANION GAP SERPL CALC-SCNC: 16 MMOL/L — SIGNIFICANT CHANGE UP (ref 5–17)
AST SERPL-CCNC: 15 U/L — SIGNIFICANT CHANGE UP
BASOPHILS # BLD AUTO: 0.03 K/UL — SIGNIFICANT CHANGE UP (ref 0–0.2)
BASOPHILS NFR BLD AUTO: 0.3 % — SIGNIFICANT CHANGE UP (ref 0–2)
BILIRUB SERPL-MCNC: 0.3 MG/DL — LOW (ref 0.4–2)
BLD GP AB SCN SERPL QL: SIGNIFICANT CHANGE UP
BUN SERPL-MCNC: 19.6 MG/DL — SIGNIFICANT CHANGE UP (ref 8–20)
CALCIUM SERPL-MCNC: 9.9 MG/DL — SIGNIFICANT CHANGE UP (ref 8.6–10.2)
CHLORIDE SERPL-SCNC: 101 MMOL/L — SIGNIFICANT CHANGE UP (ref 98–107)
CHOLEST SERPL-MCNC: 105 MG/DL — SIGNIFICANT CHANGE UP
CO2 SERPL-SCNC: 25 MMOL/L — SIGNIFICANT CHANGE UP (ref 22–29)
CREAT SERPL-MCNC: 1.46 MG/DL — HIGH (ref 0.5–1.3)
EOSINOPHIL # BLD AUTO: 0.57 K/UL — HIGH (ref 0–0.5)
EOSINOPHIL NFR BLD AUTO: 6.6 % — HIGH (ref 0–6)
ESTIMATED AVERAGE GLUCOSE: 131 MG/DL — HIGH (ref 68–114)
GLUCOSE BLDC GLUCOMTR-MCNC: 164 MG/DL — HIGH (ref 70–99)
GLUCOSE SERPL-MCNC: 133 MG/DL — HIGH (ref 70–99)
HCT VFR BLD CALC: 34.5 % — LOW (ref 39–50)
HDLC SERPL-MCNC: 39 MG/DL — LOW
HGB BLD-MCNC: 11.1 G/DL — LOW (ref 13–17)
IMM GRANULOCYTES NFR BLD AUTO: 0.2 % — SIGNIFICANT CHANGE UP (ref 0–1.5)
LIPID PNL WITH DIRECT LDL SERPL: 51 MG/DL — SIGNIFICANT CHANGE UP
LYMPHOCYTES # BLD AUTO: 2.08 K/UL — SIGNIFICANT CHANGE UP (ref 1–3.3)
LYMPHOCYTES # BLD AUTO: 24.1 % — SIGNIFICANT CHANGE UP (ref 13–44)
MAGNESIUM SERPL-MCNC: 1.9 MG/DL — SIGNIFICANT CHANGE UP (ref 1.6–2.6)
MCHC RBC-ENTMCNC: 26.6 PG — LOW (ref 27–34)
MCHC RBC-ENTMCNC: 32.2 GM/DL — SIGNIFICANT CHANGE UP (ref 32–36)
MCV RBC AUTO: 82.5 FL — SIGNIFICANT CHANGE UP (ref 80–100)
MONOCYTES # BLD AUTO: 0.6 K/UL — SIGNIFICANT CHANGE UP (ref 0–0.9)
MONOCYTES NFR BLD AUTO: 6.9 % — SIGNIFICANT CHANGE UP (ref 2–14)
NEUTROPHILS # BLD AUTO: 5.34 K/UL — SIGNIFICANT CHANGE UP (ref 1.8–7.4)
NEUTROPHILS NFR BLD AUTO: 61.9 % — SIGNIFICANT CHANGE UP (ref 43–77)
NON HDL CHOLESTEROL: 66 MG/DL — SIGNIFICANT CHANGE UP
PLATELET # BLD AUTO: 339 K/UL — SIGNIFICANT CHANGE UP (ref 150–400)
POTASSIUM SERPL-MCNC: 3.9 MMOL/L — SIGNIFICANT CHANGE UP (ref 3.5–5.3)
POTASSIUM SERPL-SCNC: 3.9 MMOL/L — SIGNIFICANT CHANGE UP (ref 3.5–5.3)
PROT SERPL-MCNC: 7.4 G/DL — SIGNIFICANT CHANGE UP (ref 6.6–8.7)
RBC # BLD: 4.18 M/UL — LOW (ref 4.2–5.8)
RBC # FLD: 14.7 % — HIGH (ref 10.3–14.5)
SODIUM SERPL-SCNC: 142 MMOL/L — SIGNIFICANT CHANGE UP (ref 135–145)
TRIGL SERPL-MCNC: 75 MG/DL — SIGNIFICANT CHANGE UP
WBC # BLD: 8.64 K/UL — SIGNIFICANT CHANGE UP (ref 3.8–10.5)
WBC # FLD AUTO: 8.64 K/UL — SIGNIFICANT CHANGE UP (ref 3.8–10.5)

## 2021-09-22 PROCEDURE — 93010 ELECTROCARDIOGRAM REPORT: CPT | Mod: 76

## 2021-09-22 PROCEDURE — 99221 1ST HOSP IP/OBS SF/LOW 40: CPT | Mod: 25

## 2021-09-22 RX ORDER — LISINOPRIL 2.5 MG/1
2.5 TABLET ORAL DAILY
Refills: 0 | Status: DISCONTINUED | OUTPATIENT
Start: 2021-09-22 | End: 2021-09-23

## 2021-09-22 RX ORDER — ASPIRIN/CALCIUM CARB/MAGNESIUM 324 MG
81 TABLET ORAL DAILY
Refills: 0 | Status: DISCONTINUED | OUTPATIENT
Start: 2021-09-23 | End: 2021-09-23

## 2021-09-22 RX ORDER — INSULIN LISPRO 100/ML
VIAL (ML) SUBCUTANEOUS
Refills: 0 | Status: DISCONTINUED | OUTPATIENT
Start: 2021-09-22 | End: 2021-09-23

## 2021-09-22 RX ORDER — SODIUM CHLORIDE 9 MG/ML
1000 INJECTION, SOLUTION INTRAVENOUS
Refills: 0 | Status: DISCONTINUED | OUTPATIENT
Start: 2021-09-22 | End: 2021-09-23

## 2021-09-22 RX ORDER — DEXTROSE 50 % IN WATER 50 %
15 SYRINGE (ML) INTRAVENOUS ONCE
Refills: 0 | Status: DISCONTINUED | OUTPATIENT
Start: 2021-09-22 | End: 2021-09-23

## 2021-09-22 RX ORDER — CLOPIDOGREL BISULFATE 75 MG/1
75 TABLET, FILM COATED ORAL DAILY
Refills: 0 | Status: DISCONTINUED | OUTPATIENT
Start: 2021-09-23 | End: 2021-09-23

## 2021-09-22 RX ORDER — CHLORHEXIDINE GLUCONATE 213 G/1000ML
1 SOLUTION TOPICAL ONCE
Refills: 0 | Status: DISCONTINUED | OUTPATIENT
Start: 2021-09-22 | End: 2021-09-23

## 2021-09-22 RX ORDER — INSULIN LISPRO 100/ML
VIAL (ML) SUBCUTANEOUS AT BEDTIME
Refills: 0 | Status: DISCONTINUED | OUTPATIENT
Start: 2021-09-22 | End: 2021-09-23

## 2021-09-22 RX ORDER — DEXTROSE 50 % IN WATER 50 %
25 SYRINGE (ML) INTRAVENOUS ONCE
Refills: 0 | Status: DISCONTINUED | OUTPATIENT
Start: 2021-09-22 | End: 2021-09-23

## 2021-09-22 RX ORDER — DEXTROSE 50 % IN WATER 50 %
12.5 SYRINGE (ML) INTRAVENOUS ONCE
Refills: 0 | Status: DISCONTINUED | OUTPATIENT
Start: 2021-09-22 | End: 2021-09-23

## 2021-09-22 RX ORDER — ATORVASTATIN CALCIUM 80 MG/1
80 TABLET, FILM COATED ORAL AT BEDTIME
Refills: 0 | Status: DISCONTINUED | OUTPATIENT
Start: 2021-09-22 | End: 2021-09-23

## 2021-09-22 RX ORDER — GLUCAGON INJECTION, SOLUTION 0.5 MG/.1ML
1 INJECTION, SOLUTION SUBCUTANEOUS ONCE
Refills: 0 | Status: DISCONTINUED | OUTPATIENT
Start: 2021-09-22 | End: 2021-09-23

## 2021-09-22 RX ORDER — METOPROLOL TARTRATE 50 MG
50 TABLET ORAL EVERY 12 HOURS
Refills: 0 | Status: DISCONTINUED | OUTPATIENT
Start: 2021-09-22 | End: 2021-09-23

## 2021-09-22 RX ADMIN — ATORVASTATIN CALCIUM 80 MILLIGRAM(S): 80 TABLET, FILM COATED ORAL at 22:37

## 2021-09-22 RX ADMIN — Medication 50 MILLIGRAM(S): at 17:41

## 2021-09-22 NOTE — DISCHARGE NOTE PROVIDER - HOSPITAL COURSE
HPI:  55yo male with h/o HTN, HLD, DM, CKD 3, CAD with prior 3V CABG 8/2020, endorses IBRAHIM 1 flight and occasional chest tightness, recent + stress test and subsequent LHC at [facility] on 9/16/21 with native 3V CAD, patent SVG to OM and SVG to RPDA, dLAD 90% lesion at LIMA distal anastomosis, LIMA otherwise patent, now presents for stage PCI to dLAD via LIMA, to be performed by Dr. Tavares.     Plan:  -plan for stage PCI to dLAD via LIMA graft via FA  -patient seen and examined  -confirmed appropriate NPO duration  -ECG and Labs reviewed  -Aspirin 324mg and Plavix 75mg po pre-cath  -procedure discussed with patient; risks and benefits explained, questions answered  -consent obtained by attending IC    Now s/p PERICO Synergy 2.75 X 38mm to dLAD via LIMA graft, procedure performed via LFA closed with angioseal device, procedure performed by Dr. Tavares, received Plavix 300mg po load, heparin for A/C, Fentanyl and Versed IV intraprocedurally, arrived to recovery in NAD and HDS, Post PCI ECG with no acute changes, LFA access site stable, no bleed/hematoma, distal pulse +, overnight admission given complex PCI with long 38mm stent placement and risk for bleeding.         Plan:  -Formal cath report pending  -Post procedure management/monitoring per protocol  -Access site precautions  -Bedrest x 2hours post procedure  -Labs and EKG in am  -Repeat ECG if any clinical indication or change on tele  -Continue current medical therapy  -Dual anti platelet therapy with aspirin/plavix   -Cont BB with Metoprolol 50mg po q12hr  -Cont statin therapy with Lipitor 80mg po qHS   -Start low dose ACE-I with lisinopril 2.5mg po daily  -Hold metformin X 48hrs  -F/S QID with correction scale coverage while in-pt  -Educated regarding strict adherence with DAPT   -Educated regarding post procedure management and care  -Discussed the importance of RF modification  -Cardiac rehab info provided/referral and communication to cardiac rehab completed  -F/U outpt in 1-2 weeks with Cardiologist Dr. Reyes  -DISPO: Plan for D/C in am if remains HDS, ECG and labs in am stable and without complications

## 2021-09-22 NOTE — DISCHARGE NOTE PROVIDER - NSDCCPTREATMENT_GEN_ALL_CORE_FT
PRINCIPAL PROCEDURE  Procedure: Percutaneous coronary intervention (PCI) with insertion of stent into left anterior descending (LAD) coronary artery  Findings and Treatment: Go to the ED with any acute onset of chest pain, palpitations, shortness of breath or dizziness. Do NOT miss a dose or stop taking your Aspirin and Plavix, these medications keep your stent open and prevent a heart attack. If anyone tells you to stop these medications, speak to your cardiologist immediately.  Managing risk factors will help keep your stent open and prevent future blockages, risk factors may include: high blood pressure, high cholesterol, diabetes, obesity, sedentary life style and smoking.    Your diet should be low in fat, cholesterol, salt and carbohydrates, increase fruits (caution if diabetic), vegetables and whole grains/fiber rich foods.   Take all your cardiac  medications as prescribed.    No heavy lifting, driving, sex, tub baths, swimming, or any activity that submerges the lower half of the body in water for 48 hours.  Limited walking and stairs for 48 hours.    Change the bandaid after 24 hours and every 24 hours after that.  Keep the puncture site dry and covered with a bandaid until a scab forms.    Observe the site frequently.  If bleeding or a large lump (the size of a golf ball or bigger) occurs lie flat, apply continuous direct pressure just above the puncture site for at least 10 minutes, and notify your physician immediately.  If the bleeding cannot be controlled, call 911 immediately for assistance.  Notify your physician of pain, swelling or any drainage.    Notify your physician immediately if coldness, numbness, discoloration or pain in your foot occurs.  Exercise is a very important factor in heart health. Once your post procedure restrictions have passed, you should engage in heart healthy, aerobic exercise. Be sure to have clearance from your cardiologist. Cardiac rehab programs could be extremely beneficial and your cardiologist could help set this up.   Follow up with your cardiologist within 1-2 weeks after your procedure.   Call your cardiologist or our unit ([phone]) with any questions or concerns that may arise.

## 2021-09-22 NOTE — DISCHARGE NOTE PROVIDER - NSDCCPCAREPLAN_GEN_ALL_CORE_FT
PRINCIPAL DISCHARGE DIAGNOSIS  Diagnosis: CAD (coronary artery disease)  Assessment and Plan of Treatment: We placed a drug eluting stent to your distal left anterior descending coronary artery. Go to the ED with any acute onset of chest pain, palpitations, shortness of breath or dizziness. Do NOT miss a dose or stop taking your Aspirin and Plavix, these medications keep your stent open and prevent a heart attack. If anyone tells you to stop these medications, speak to your cardiologist immediately.  Managing risk factors will help keep your stent open and prevent future blockages, risk factors may include: high blood pressure, high cholesterol, diabetes, obesity, sedentary life style and smoking.    Your diet should be low in fat, cholesterol, salt and carbohydrates, increase fruits (caution if diabetic), vegetables and whole grains/fiber rich foods.   Take all your cardiac  medications as prescribed.    Exercise is a very important factor in heart health. Once your post procedure restrictions have passed, you should engage in heart healthy, aerobic exercise. Be sure to have clearance from your cardiologist. Cardiac rehab programs could be extremely beneficial and your cardiologist could help set this up.   Follow up with your cardiologist within 1-2 weeks after your procedure.   Call your cardiologist or our unit ([phone]) with any questions or concerns that may arise.      SECONDARY DISCHARGE DIAGNOSES  Diagnosis: HTN (hypertension)  Assessment and Plan of Treatment: Continue to take antihypertensive medications as prescribed. Obtain a home blood pressure monitor (at any pharmacy or medical supply store) and monitor blood pressure trends. Call your doctor if you note you blood pressure to be running much higher or lower than usual. Limit salt intake.    Diagnosis: HLD (hyperlipidemia)  Assessment and Plan of Treatment: Your diet should be low in fat, cholesterol, salt and carbohydrates, increase fruits (caution if diabetic), vegetables and whole grains/fiber rich foods. Take your cholesterol lowering medications as prescribed. Routine follow up lipid panels    Diagnosis: DM (diabetes mellitus)  Assessment and Plan of Treatment: Follow a carbohydrate controlled diet. Monitor your blood sugar levels before meals and at bedtime and keep a log of your values. Follow up for routine HbA1C levels which indicate your sugar control over the previous few months. You should ensure you have a provider that is closely monitoring your diabetes and keeping your sugar levels well controlled. Weight loss, carb controlled healthy diet and exercise can help improve your glucose control. It is also improtant to monitor for low sugar levels as levels too low (below 70) can be very dangerous. You should always carry sugar tablets or something to help bring the sugar level up if it drops too low. Symptoms of low sugar levls are: fatigue, lightheaded, mental confusion, fainting, sweating, shaking and excessive hunger.

## 2021-09-22 NOTE — DISCHARGE NOTE PROVIDER - EXTENDED VTE YES NO FOR MLM ENOXAPARIN
Pt c/o sore throat, states her throat started to feel \"scratchy\" today then this evening she felt it was more painful to swallow. Pt also has slight nasal drainage.  Denies fever or cough ,

## 2021-09-22 NOTE — DISCHARGE NOTE PROVIDER - CARE PROVIDER_API CALL
Pb Reyes)  Cardiology; Internal Medicine  [address] [place] [address]  Phone: [phone]  Fax: [phone]  Follow Up Time:

## 2021-09-22 NOTE — DISCHARGE NOTE PROVIDER - NSDCFUADDINST_GEN_ALL_CORE_FT
Go to the ED with any acute onset of chest pain, palpitations, shortness of breath or dizziness. Do NOT miss a dose or stop taking your Aspirin and Plavix, these medications keep your stent open and prevent a heart attack. If anyone tells you to stop these medications, speak to your cardiologist immediately.  Managing risk factors will help keep your stent open and prevent future blockages, risk factors may include: high blood pressure, high cholesterol, diabetes, obesity, sedentary life style and smoking.    Your diet should be low in fat, cholesterol, salt and carbohydrates, increase fruits (caution if diabetic), vegetables and whole grains/fiber rich foods.   Take all your cardiac  medications as prescribed.    No heavy lifting, driving, sex, tub baths, swimming, or any activity that submerges the lower half of the body in water for 48 hours.  Limited walking and stairs for 48 hours.    Change the bandaid after 24 hours and every 24 hours after that.  Keep the puncture site dry and covered with a bandaid until a scab forms.    Observe the site frequently.  If bleeding or a large lump (the size of a golf ball or bigger) occurs lie flat, apply continuous direct pressure just above the puncture site for at least 10 minutes, and notify your physician immediately.  If the bleeding cannot be controlled, call 911 immediately for assistance.  Notify your physician of pain, swelling or any drainage.    Notify your physician immediately if coldness, numbness, discoloration or pain in your foot occurs.    Exercise is a very important factor in heart health. Once your post procedure restrictions have passed, you should engage in heart healthy, aerobic exercise. Be sure to have clearance from your cardiologist. Cardiac rehab programs could be extremely beneficial and your cardiologist could help set this up.   Follow up with your cardiologist within 1-2 weeks after your procedure.   Call your cardiologist or our unit ([phone]) with any questions or concerns that may arise.

## 2021-09-22 NOTE — DISCHARGE NOTE PROVIDER - NSDCMRMEDTOKEN_GEN_ALL_CORE_FT
aspirin 81 mg oral delayed release tablet: 1 tab(s) orally once a day  atorvastatin 80 mg oral tablet: 1 tab(s) orally once a day (at bedtime)  clopidogrel 75 mg oral tablet: 1 tab(s) orally once a day  metFORMIN 1000 mg oral tablet: 1 tab(s) orally 2 times a day  metoprolol tartrate 50 mg oral tablet: 1 tab(s) orally 2 times a day   aspirin 81 mg oral delayed release tablet: 1 tab(s) orally once a day  atorvastatin 80 mg oral tablet: 1 tab(s) orally once a day (at bedtime)  clopidogrel 75 mg oral tablet: 1 tab(s) orally once a day  Complete Metabolic pannel : 1 application compounding once a day   lisinopril 2.5 mg oral tablet: 1 tab(s) orally once a day  metFORMIN 1000 mg oral tablet: 1 tab(s) orally 2 times a day  metoprolol tartrate 50 mg oral tablet: 1 tab(s) orally 2 times a day

## 2021-09-22 NOTE — H&P PST ADULT - ASSESSMENT
Impression:  57yo male with CAD and prior 3V CABG, recent cath [facility] with dLAD 90% lesion at LIMA distal anastomosis, now presents for stage PCI to dLAD via LIMA graft to be performed by Dr. Tavares.     Plan:  -plan for LHC via RA vs FA  -patient seen and examined  -confirmed appropriate NPO duration  -ECG and Labs reviewed  -Aspirin 81mg and Plavix 75mg po pre-cath  -procedure discussed with patient; risks and benefits explained, questions answered  -consent obtained by attending IC         Impression:  55yo male with CAD and prior 3V CABG, recent cath [facility] with dLAD 90% lesion at LIMA distal anastomosis, now presents for stage PCI to dLAD via LIMA graft to be performed by Dr. Tavares.     Plan:  -plan for stage PCI to dLAD via LIMA graft via FA  -patient seen and examined  -confirmed appropriate NPO duration  -ECG and Labs reviewed  -Aspirin 324mg and Plavix 75mg po pre-cath  -procedure discussed with patient; risks and benefits explained, questions answered  -consent obtained by attending IC

## 2021-09-22 NOTE — H&P PST ADULT - HISTORY OF PRESENT ILLNESS
Department of Cardiology                                                                  [facility]/[place] [address] [address]                                                            Telephone: [phone]. Fax:[phone]                                                                             Pre- Procedure H + P/Progress Note      HPI:        Symptoms:        Angina (Class):        Ischemic Symptoms:     Heart Failure:        Systolic/Diastolic/Combined:        NYHA Class (within 2 weeks):     Assessment of LVEF:       EF:        Assessed by:        Date:     Prior Cardiac Interventions:         Noninvasive Testing:   Stress Test: Date:        Protocol:        Duration of Exercise:        Symptoms:        EKG Changes:        DTS:        Myocardial Imaging:        Risk Assessment (Low, Medium, High):     Echo:       Risk Assessments:  ASA:  Mallampati:  Bleeding Risk:  Creatinine:  GFR:    Associated Risk Factors:        Cerebrovascular Disease: N/A       Chronic Lung Disease: N/A       Peripheral Arterial Disease: N/A       Chronic Kidney Disease (if yes, what is GFR): N/A       Uncontrolled Diabetes (if yes, what is HgbA1C or FBS): N/A       Poorly Controlled Hypertension (if yes, what is SBP): N/A       Morbid Obesity (if yes, what is BMI): N/A       History of Recent Ventricular Arrhythmia: N/A       Inability to Ambulate Safely: N/A       Need for Therapeutic Anticoagulation: N/A       Antiplatelet or Contrast Allergy: N/A    Antianginal Therapies:        Beta Blockers:         Calcium Channel Blockers:        Long Acting Nitrates:        Ranexa:     	  MEDICATIONS:              chlorhexidine 4% Liquid 1 Application(s) Topical once        ROS: as stated above, otherwise negative    PHYSICAL EXAM:  Constitutional: A & O x 3  HEENT:   Normal oral mucosa, PERRL, EOMI	  Cardiovascular: Normal S1 S2, No JVD, No murmurs, No edema  Respiratory: Lungs clear to auscultation	  Gastrointestinal:  Soft, Non-tender, + BS	  Skin: No rashes, No ecchymoses, No cyanosis  Neurologic: Non-focal  Extremities: Normal range of motion, No clubbing, cyanosis or edema  Vascular: Peripheral pulses palpable 2+ bilaterally      T(C): --  HR: --  BP: --  RR: --  SpO2: --  Wt(kg): --      I&O's Summary      Daily     Daily     TELEMETRY: 	      ECG:  	    LABS:	 	                        Tnl:    Lipid Profile:   TC  TG  LDL  HDL    HgA1c:     proBNP:     TSH:     Impression:      Plan:  -plan for LHC via RA vs FA  -patient seen and examined  -confirmed appropriate NPO duration  -ECG and Labs reviewed  -Aspirin 81mg po pre-cath  -procedure discussed with patient; risks and benefits explained, questions answered  -consent obtained by attending IC                                                                               Department of Cardiology                                                                  [facility]/[place] [address] [address]                                                            Telephone: [phone]. Fax:[phone]                                                                             Pre- Procedure H + P/Progress Note      HPI:  55yo male with h/o HTN, HLD, DM, CAD with prior 3V CABG 8/2020, [facility] at [facility] on 9/16/21 with native 3V CAD, patent SVG to OM and SVG to RPDA, dLAD 90% lesion at LIMA distal anastomosis, LIMA otherwise patent, now presents for stage PCI to dLAD via LIMA, to be performed by Dr. Tavares.       Symptoms:        Angina (Class):        Ischemic Symptoms:     Heart Failure: no       Systolic/Diastolic/Combined:        NYHA Class (within 2 weeks):     Assessment of LVEF:       EF: 65-70%       Assessed by: TTE       Date: 8/25/21    Prior Cardiac Interventions:  3V CABG 8/2020: LIMA to LAD, SVG to OM and SVG to RPDA    [facility] 9/16/21 with native 3V CAD, Patent grafts with dLAD 90% lesion at distal LIMA anastamosis         Noninvasive Testing:   Stress Test: Date: ETT 11/2020       Protocol: [place]       Duration of Exercise: 6min       EKG Changes: no significant changes         Echo: 8/25/21  Summary:   1. Limited echocardiogram performed.   2. Technically difficult study.   3. Normal global left ventricular systolic function.   4. Left ventricular ejection fraction, by visual estimation, is 65 to 70%.   5. Abnormal septal motion.   6. Normal left ventricular internal cavity size.   7. The right ventricle was not well visualized.   8. Pericardium not well visualized.   9. Recommend clinical correlation with the above findings.      Risk Assessments:  ASA: 3  Mallampati: 2  Bleeding Risk:  Creatinine:  GFR:    Associated Risk Factors:        Cerebrovascular Disease: N/A       Chronic Lung Disease: N/A       Peripheral Arterial Disease: N/A       Chronic Kidney Disease (if yes, what is GFR): N/A       Uncontrolled Diabetes (if yes, what is HgbA1C or FBS): N/A       Poorly Controlled Hypertension (if yes, what is SBP): N/A       Morbid Obesity (if yes, what is BMI): N/A       History of Recent Ventricular Arrhythmia: N/A       Inability to Ambulate Safely: N/A       Need for Therapeutic Anticoagulation: N/A       Antiplatelet or Contrast Allergy: N/A    Antianginal Therapies:        Beta Blockers:  metoprolol       Calcium Channel Blockers:        Long Acting Nitrates:        Ranexa:     	  ROS: as stated above, otherwise negative    PHYSICAL EXAM:  Constitutional: A & O x 3  HEENT:   Normal oral mucosa, PERRL, EOMI	  Cardiovascular: Normal S1 S2, No JVD, No murmurs, No edema  Respiratory: Lungs clear to auscultation	  Gastrointestinal:  Soft, Non-tender, + BS	  Skin: No rashes, No ecchymoses, No cyanosis  Neurologic: Non-focal  Extremities: Normal range of motion, No clubbing, cyanosis or edema  Vascular: Peripheral pulses palpable 2+ bilaterally      TELEMETRY: 	      ECG:  	                                                                             Department of Cardiology                                                                  [facility]/[place] [address] [id number]                                                            Telephone: [phone]. Fax:[phone]                                                                             Pre- Procedure H + P/Progress Note      HPI:  57yo male with h/o HTN, HLD, DM, CAD with prior 3V CABG 8/2020, endorses IBRAHIM 1 flight and occasional chest tightness, recent + stress test and subsequent LHC at [facility] [place] on 9/16/21 with native 3V CAD, patent SVG to OM and SVG to RPDA, dLAD 90% lesion at LIMA distal anastomosis, LIMA otherwise patent, now presents for stage PCI to dLAD via LIMA, to be performed by Dr. Tavares.       Symptoms:        Angina (Class): III       Ischemic Symptoms: IBRAHIM and chest tightness    Heart Failure: no        Assessment of LVEF:       EF: 65-70%       Assessed by: TTE       Date: 8/25/21    Prior Cardiac Interventions:  3V CABG 8/2020: LIMA to LAD, SVG to OM and SVG to RPDA    LHC 9/16/21 with native 3V CAD, Patent grafts with dLAD 90% lesion at distal LIMA anastamosis         Noninvasive Testing:   Stress Test: recent + stress test         Echo: 8/25/21  Summary:   1. Limited echocardiogram performed.   2. Technically difficult study.   3. Normal global left ventricular systolic function.   4. Left ventricular ejection fraction, by visual estimation, is 65 to 70%.   5. Abnormal septal motion.   6. Normal left ventricular internal cavity size.   7. The right ventricle was not well visualized.   8. Pericardium not well visualized.   9. Recommend clinical correlation with the above findings.      Risk Assessments:  ASA: 3  Mallampati: 2  Bleeding Risk:  Creatinine:  GFR:    Associated Risk Factors:        Cerebrovascular Disease: N/A       Chronic Lung Disease: N/A       Peripheral Arterial Disease: N/A       Chronic Kidney Disease (if yes, what is GFR): N/A       Uncontrolled Diabetes (if yes, what is HgbA1C or FBS): N/A       Poorly Controlled Hypertension (if yes, what is SBP): N/A       Morbid Obesity (if yes, what is BMI): N/A       History of Recent Ventricular Arrhythmia: N/A       Inability to Ambulate Safely: N/A       Need for Therapeutic Anticoagulation: N/A       Antiplatelet or Contrast Allergy: N/A    Antianginal Therapies:        Beta Blockers:  metoprolol       Calcium Channel Blockers:        Long Acting Nitrates:        Ranexa:     	  ROS: as stated above, otherwise negative    PHYSICAL EXAM:  Constitutional: A & O x 3  HEENT:   Normal oral mucosa, PERRL, EOMI	  Cardiovascular: Normal S1 S2, No JVD, No murmurs, No edema  Respiratory: Lungs clear to auscultation	  Gastrointestinal:  Soft, Non-tender, + BS	  Skin: No rashes, No ecchymoses, No cyanosis  Neurologic: Non-focal  Extremities: Normal range of motion, No clubbing, cyanosis or edema  Vascular: Peripheral pulses palpable 2+ bilaterally      TELEMETRY: SR 60's 	      ECG: SR 61BPM 	                                                                             Department of Cardiology                                                                  [facility]/[place] [address] [address]                                                            Telephone: [phone]. Fax:[phone]                                                                             Pre- Procedure H + P/Progress Note      HPI:  55yo male with h/o HTN, HLD, DM, CKD 3, CAD with prior 3V CABG 8/2020, endorses IBRAHIM 1 flight and occasional chest tightness, recent + stress test and subsequent [facility] on 9/16/21 with native 3V CAD, patent SVG to OM and SVG to RPDA, dLAD 90% lesion at LIMA distal anastomosis, LIMA otherwise patent, now presents for stage PCI to dLAD via LIMA, to be performed by Dr. Tavares.       Symptoms:        Angina (Class): III       Ischemic Symptoms: IBRAHIM and chest tightness    Heart Failure: no        Assessment of LVEF:       EF: 65-70%       Assessed by: TTE       Date: 8/25/21    Prior Cardiac Interventions:  3V CABG 8/2020: LIMA to LAD, SVG to OM and SVG to RPDA    LHC 9/16/21 with native 3V CAD, Patent grafts with dLAD 90% lesion at distal LIMA anastamosis         Noninvasive Testing:   Stress Test: recent + stress test         Echo: 8/25/21  Summary:   1. Limited echocardiogram performed.   2. Technically difficult study.   3. Normal global left ventricular systolic function.   4. Left ventricular ejection fraction, by visual estimation, is 65 to 70%.   5. Abnormal septal motion.   6. Normal left ventricular internal cavity size.   7. The right ventricle was not well visualized.   8. Pericardium not well visualized.   9. Recommend clinical correlation with the above findings.      Risk Assessments:  ASA: 3  Mallampati: 2  Bleeding Risk:  Creatinine:  GFR:    Associated Risk Factors:        Cerebrovascular Disease: N/A       Chronic Lung Disease: N/A       Peripheral Arterial Disease: N/A       Chronic Kidney Disease (if yes, what is GFR): N/A       Uncontrolled Diabetes (if yes, what is HgbA1C or FBS): N/A       Poorly Controlled Hypertension (if yes, what is SBP): N/A       Morbid Obesity (if yes, what is BMI): N/A       History of Recent Ventricular Arrhythmia: N/A       Inability to Ambulate Safely: N/A       Need for Therapeutic Anticoagulation: N/A       Antiplatelet or Contrast Allergy: N/A    Antianginal Therapies:        Beta Blockers:  metoprolol       Calcium Channel Blockers:        Long Acting Nitrates:        Ranexa:     	  ROS: as stated above, otherwise negative    PHYSICAL EXAM:  Constitutional: A & O x 3  HEENT:   Normal oral mucosa, PERRL, EOMI	  Cardiovascular: Normal S1 S2, No JVD, No murmurs, No edema  Respiratory: Lungs clear to auscultation	  Gastrointestinal:  Soft, Non-tender, + BS	  Skin: No rashes, No ecchymoses, No cyanosis  Neurologic: Non-focal  Extremities: Normal range of motion, No clubbing, cyanosis or edema  Vascular: Peripheral pulses palpable 2+ bilaterally      TELEMETRY: SR 60's 	      ECG: SR 61BPM 	                                                                             Department of Cardiology                                                                  [facility]/[place] [address] [id number]                                                            Telephone: [phone]. Fax:[phone]                                                                             Pre- Procedure H + P/Progress Note      HPI:  55yo male with h/o HTN, HLD, DM, CKD 3, CAD with prior 3V CABG 8/2020, endorses IBRAHIM 1 flight and occasional chest tightness, recent + stress test and subsequent LHC at [facility] [place] on 9/16/21 with native 3V CAD, patent SVG to OM and SVG to RPDA, dLAD 90% lesion at LIMA distal anastomosis, LIMA otherwise patent, now presents for stage PCI to dLAD via LIMA, to be performed by Dr. Tavares.       Symptoms:        Angina (Class): III       Ischemic Symptoms: IBRAHIM and chest tightness    Heart Failure: no        Assessment of LVEF:       EF: 65-70%       Assessed by: TTE       Date: 8/25/21    Prior Cardiac Interventions:  3V CABG 8/2020: LIMA to LAD, SVG to OM and SVG to RPDA    LHC 9/16/21 with native 3V CAD, Patent grafts with dLAD 90% lesion at distal LIMA anastamosis         Noninvasive Testing:   Stress Test: recent + stress test         Echo: 8/25/21  Summary:   1. Limited echocardiogram performed.   2. Technically difficult study.   3. Normal global left ventricular systolic function.   4. Left ventricular ejection fraction, by visual estimation, is 65 to 70%.   5. Abnormal septal motion.   6. Normal left ventricular internal cavity size.   7. The right ventricle was not well visualized.   8. Pericardium not well visualized.   9. Recommend clinical correlation with the above findings.      Risk Assessments:  ASA: 3  Mallampati: 2  Bleeding Risk: 4%  Creatinine: 1.46  GFR: 54    Associated Risk Factors:        Cerebrovascular Disease: N/A       Chronic Lung Disease: N/A       Peripheral Arterial Disease: N/A       Chronic Kidney Disease (if yes, what is GFR): N/A       Uncontrolled Diabetes (if yes, what is HgbA1C or FBS): N/A       Poorly Controlled Hypertension (if yes, what is SBP): N/A       Morbid Obesity (if yes, what is BMI): N/A       History of Recent Ventricular Arrhythmia: N/A       Inability to Ambulate Safely: N/A       Need for Therapeutic Anticoagulation: N/A       Antiplatelet or Contrast Allergy: N/A    Antianginal Therapies:        Beta Blockers:  metoprolol       Calcium Channel Blockers:        Long Acting Nitrates:        Ranexa:     	  ROS: as stated above, otherwise negative    PHYSICAL EXAM:  Constitutional: A & O x 3  HEENT:   Normal oral mucosa, PERRL, EOMI	  Cardiovascular: Normal S1 S2, No JVD, No murmurs, No edema  Respiratory: Lungs clear to auscultation	  Gastrointestinal:  Soft, Non-tender, + BS	  Skin: No rashes, No ecchymoses, No cyanosis  Neurologic: Non-focal  Extremities: Normal range of motion, No clubbing, cyanosis or edema  Vascular: Peripheral pulses palpable 2+ bilaterally      TELEMETRY: SR 60's 	      ECG: SR 61BPM

## 2021-09-23 ENCOUNTER — TRANSCRIPTION ENCOUNTER (OUTPATIENT)
Age: 56
End: 2021-09-23

## 2021-09-23 VITALS
TEMPERATURE: 98 F | SYSTOLIC BLOOD PRESSURE: 128 MMHG | OXYGEN SATURATION: 99 % | DIASTOLIC BLOOD PRESSURE: 82 MMHG | RESPIRATION RATE: 18 BRPM | HEART RATE: 76 BPM

## 2021-09-23 LAB
ALBUMIN SERPL ELPH-MCNC: 4.2 G/DL — SIGNIFICANT CHANGE UP (ref 3.3–5.2)
ALP SERPL-CCNC: 77 U/L — SIGNIFICANT CHANGE UP (ref 40–120)
ALT FLD-CCNC: 11 U/L — SIGNIFICANT CHANGE UP
ANION GAP SERPL CALC-SCNC: 12 MMOL/L — SIGNIFICANT CHANGE UP (ref 5–17)
AST SERPL-CCNC: 15 U/L — SIGNIFICANT CHANGE UP
BILIRUB SERPL-MCNC: 0.5 MG/DL — SIGNIFICANT CHANGE UP (ref 0.4–2)
BUN SERPL-MCNC: 20.2 MG/DL — HIGH (ref 8–20)
CALCIUM SERPL-MCNC: 10 MG/DL — SIGNIFICANT CHANGE UP (ref 8.6–10.2)
CHLORIDE SERPL-SCNC: 104 MMOL/L — SIGNIFICANT CHANGE UP (ref 98–107)
CO2 SERPL-SCNC: 25 MMOL/L — SIGNIFICANT CHANGE UP (ref 22–29)
CREAT SERPL-MCNC: 1.23 MG/DL — SIGNIFICANT CHANGE UP (ref 0.5–1.3)
GLUCOSE BLDC GLUCOMTR-MCNC: 133 MG/DL — HIGH (ref 70–99)
GLUCOSE SERPL-MCNC: 134 MG/DL — HIGH (ref 70–99)
HCT VFR BLD CALC: 36 % — LOW (ref 39–50)
HGB BLD-MCNC: 11.7 G/DL — LOW (ref 13–17)
MAGNESIUM SERPL-MCNC: 2 MG/DL — SIGNIFICANT CHANGE UP (ref 1.6–2.6)
MCHC RBC-ENTMCNC: 26.2 PG — LOW (ref 27–34)
MCHC RBC-ENTMCNC: 32.5 GM/DL — SIGNIFICANT CHANGE UP (ref 32–36)
MCV RBC AUTO: 80.5 FL — SIGNIFICANT CHANGE UP (ref 80–100)
PLATELET # BLD AUTO: 338 K/UL — SIGNIFICANT CHANGE UP (ref 150–400)
POTASSIUM SERPL-MCNC: 4.1 MMOL/L — SIGNIFICANT CHANGE UP (ref 3.5–5.3)
POTASSIUM SERPL-SCNC: 4.1 MMOL/L — SIGNIFICANT CHANGE UP (ref 3.5–5.3)
PROT SERPL-MCNC: 6.9 G/DL — SIGNIFICANT CHANGE UP (ref 6.6–8.7)
RBC # BLD: 4.47 M/UL — SIGNIFICANT CHANGE UP (ref 4.2–5.8)
RBC # FLD: 14.6 % — HIGH (ref 10.3–14.5)
SODIUM SERPL-SCNC: 141 MMOL/L — SIGNIFICANT CHANGE UP (ref 135–145)
WBC # BLD: 8.28 K/UL — SIGNIFICANT CHANGE UP (ref 3.8–10.5)
WBC # FLD AUTO: 8.28 K/UL — SIGNIFICANT CHANGE UP (ref 3.8–10.5)

## 2021-09-23 PROCEDURE — C1894: CPT

## 2021-09-23 PROCEDURE — C1874: CPT

## 2021-09-23 PROCEDURE — 93005 ELECTROCARDIOGRAM TRACING: CPT

## 2021-09-23 PROCEDURE — 80061 LIPID PANEL: CPT

## 2021-09-23 PROCEDURE — 36415 COLL VENOUS BLD VENIPUNCTURE: CPT

## 2021-09-23 PROCEDURE — 83735 ASSAY OF MAGNESIUM: CPT

## 2021-09-23 PROCEDURE — C9600: CPT | Mod: LD

## 2021-09-23 PROCEDURE — 99152 MOD SED SAME PHYS/QHP 5/>YRS: CPT

## 2021-09-23 PROCEDURE — C1725: CPT

## 2021-09-23 PROCEDURE — C1887: CPT

## 2021-09-23 PROCEDURE — C1760: CPT

## 2021-09-23 PROCEDURE — 86900 BLOOD TYPING SEROLOGIC ABO: CPT

## 2021-09-23 PROCEDURE — C1769: CPT

## 2021-09-23 PROCEDURE — 82962 GLUCOSE BLOOD TEST: CPT

## 2021-09-23 PROCEDURE — 99233 SBSQ HOSP IP/OBS HIGH 50: CPT

## 2021-09-23 PROCEDURE — 86901 BLOOD TYPING SEROLOGIC RH(D): CPT

## 2021-09-23 PROCEDURE — 93010 ELECTROCARDIOGRAM REPORT: CPT

## 2021-09-23 PROCEDURE — 83036 HEMOGLOBIN GLYCOSYLATED A1C: CPT

## 2021-09-23 PROCEDURE — 85025 COMPLETE CBC W/AUTO DIFF WBC: CPT

## 2021-09-23 PROCEDURE — 86850 RBC ANTIBODY SCREEN: CPT

## 2021-09-23 PROCEDURE — 85027 COMPLETE CBC AUTOMATED: CPT

## 2021-09-23 PROCEDURE — 99153 MOD SED SAME PHYS/QHP EA: CPT

## 2021-09-23 PROCEDURE — 80053 COMPREHEN METABOLIC PANEL: CPT

## 2021-09-23 RX ORDER — CLOPIDOGREL BISULFATE 75 MG/1
1 TABLET, FILM COATED ORAL
Qty: 0 | Refills: 0 | DISCHARGE

## 2021-09-23 RX ORDER — ATORVASTATIN CALCIUM 80 MG/1
1 TABLET, FILM COATED ORAL
Qty: 90 | Refills: 0
Start: 2021-09-23 | End: 2021-12-21

## 2021-09-23 RX ORDER — CLOPIDOGREL BISULFATE 75 MG/1
1 TABLET, FILM COATED ORAL
Qty: 90 | Refills: 1
Start: 2021-09-23 | End: 2022-03-21

## 2021-09-23 RX ORDER — LISINOPRIL 2.5 MG/1
1 TABLET ORAL
Qty: 90 | Refills: 0
Start: 2021-09-23 | End: 2021-12-21

## 2021-09-23 RX ADMIN — Medication 50 MILLIGRAM(S): at 05:37

## 2021-09-23 RX ADMIN — LISINOPRIL 2.5 MILLIGRAM(S): 2.5 TABLET ORAL at 05:38

## 2021-09-23 NOTE — DISCHARGE NOTE NURSING/CASE MANAGEMENT/SOCIAL WORK - PATIENT PORTAL LINK FT
You can access the FollowMyHealth Patient Portal offered by [facility] by registering at the following website: [email or web address]. By joining [employer]’s FollowMyHealth portal, you will also be able to view your health information using other applications (apps) compatible with our system.

## 2021-09-23 NOTE — PROGRESS NOTE ADULT - SUBJECTIVE AND OBJECTIVE BOX
Department of Cardiology                                                                  [facility]/[place] [address]                                                            Telephone: [phone]. Fax:[phone]                                                    Post- Procedure Note: Left Heart Cardiac Catheterization       Narrative:  56y  Male now s/p left heart catheterization via Left FA for abnormal NST. Post elective LAD stent --LIMA.  No complaints this am of chest pain IBRAHIM and is tolerating PO and ambulating with out gait disturbance.          PAST MEDICAL & SURGICAL HISTORY:  Diabetes mellitus, type 2    Hyperlipidemia      Home Medications:  clopidogrel 75 mg oral tablet: 1 tab(s) orally once a day (22 Sep 2021 17:46)    No Known Allergies  OHS (Unknown)      Objective:  Vital Signs Last 24 Hrs  T(C): 36.6 (23 Sep 2021 05:35), Max: 36.7 (22 Sep 2021 11:25)  T(F): 97.9 (23 Sep 2021 05:35), Max: 98.1 (22 Sep 2021 17:30)  HR: 63 (23 Sep 2021 05:35) (54 - 64)  BP: 131/84 (23 Sep 2021 05:35) (115/76 - 177/98)  BP(mean): --  RR: 18 (23 Sep 2021 05:35) (16 - 18)  SpO2: 97% (23 Sep 2021 05:35) (96% - 98%)    ROS: as stated above, otherwise negative    Tele: reviewed by me   EKG Sinus No ACute changes     PHYSICAL EXAM:  Constitutional: A & O x 3, NAD  HEENT:  Normal oral mucosa, PERRL, EOMI	  Cardiovascular: S1 S2, No murmurs, No JVD  Respiratory: Lungs clear to auscultation	  Neurologic: No deficit appreciated  Extremities: Normal range of motion, no edema  Vascular: LEFT FA Access site stable, no bleed or hematoma, distal pulses +     Labs:                           11.7   8.28  )-----------( 338      ( 23 Sep 2021 05:48 )             36.0     09-23    141  |  104  |  20.2<H>  ----------------------------<  134<H>  4.1   |  25.0  |  1.23    Ca    10.0      23 Sep 2021 05:48  Mg     2.0     09-23    TPro  6.9  /  Alb  4.2  /  TBili  0.5  /  DBili  x   /  AST  15  /  ALT  11  /  AlkPhos  77  09-23          Assessment:    56y  Male  CABG 1 year ago with IBRAHIM and fatigue Post Abnormal NST Now Post PERICO to LIMA-- LAD.     Plan:  -Formal cath report reviewed   -Post procedure management/monitoring discussed with patient   -Access site precautions  -EKG am and Labs reviewed this am   - Will need to have CMP repeated post cath in 1-2 weeks  -Continue metoprolol 50mg po daily as prior  - Start Lisinopril 2.5mg daily   -Dual anti platelet therapy with aspirin/plavix indefinitely   -Hold metformin for 48hrs post cath   -Cont statin lipitor 80mg  therapy as prior   -Discussed the importance of RF modification  -Cardiac rehab info provided/referral and communication to cardiac rehab completed  -F/U outpt in 1-2 weeks with Cardiologist Dr. Archuleta   -DISPO:  Plan for D/C HOME.

## 2021-09-27 ENCOUNTER — APPOINTMENT (OUTPATIENT)
Dept: CARDIOLOGY | Facility: CLINIC | Age: 56
End: 2021-09-27
Payer: COMMERCIAL

## 2021-09-27 PROCEDURE — 99442: CPT

## 2021-09-28 PROBLEM — R79.89 LOW SERUM LIPOPROTEIN(A): Status: RESOLVED | Noted: 2021-09-28 | Resolved: 2021-09-28

## 2021-10-07 ENCOUNTER — TRANSCRIPTION ENCOUNTER (OUTPATIENT)
Age: 56
End: 2021-10-07

## 2021-10-18 ENCOUNTER — TRANSCRIPTION ENCOUNTER (OUTPATIENT)
Age: 56
End: 2021-10-18

## 2021-11-10 ENCOUNTER — TRANSCRIPTION ENCOUNTER (OUTPATIENT)
Age: 56
End: 2021-11-10

## 2021-12-07 ENCOUNTER — TRANSCRIPTION ENCOUNTER (OUTPATIENT)
Age: 56
End: 2021-12-07

## 2022-02-24 ENCOUNTER — NON-APPOINTMENT (OUTPATIENT)
Age: 57
End: 2022-02-24

## 2022-03-14 ENCOUNTER — APPOINTMENT (OUTPATIENT)
Dept: CARDIOLOGY | Facility: CLINIC | Age: 57
End: 2022-03-14
Payer: COMMERCIAL

## 2022-03-14 VITALS
OXYGEN SATURATION: 96 % | SYSTOLIC BLOOD PRESSURE: 114 MMHG | HEART RATE: 63 BPM | HEIGHT: 70 IN | DIASTOLIC BLOOD PRESSURE: 68 MMHG | BODY MASS INDEX: 29.2 KG/M2 | WEIGHT: 204 LBS

## 2022-03-14 DIAGNOSIS — I25.5 ISCHEMIC CARDIOMYOPATHY: ICD-10-CM

## 2022-03-14 PROCEDURE — 99215 OFFICE O/P EST HI 40 MIN: CPT

## 2022-03-15 NOTE — PHYSICAL EXAM
[Normal] : clear lung fields, good air entry, no respiratory distress [Normal Gait] : normal gait [No Edema] : no edema

## 2022-03-15 NOTE — ASSESSMENT
[FreeTextEntry1] : ALLEN SLAUGHTER is a 56 year old M who presents today Mar 14, 2022 with the above history and the following active issues: \par \par Severe triple-vessel disease s/p CABG then subsequent high risk PCI 9/2021\par Progressive reduction in exercise tolerance prior to revascularization. \par Now with improvement in stamina, no angina, but with PVCs 8% burden on monitor. No sustained dysrhythmia noted. Reviewed monitor results with patient. \par He may restart cardiac rehab at this time. \par Discussed stress CMR r/o recurrent ischemic heart disease and for tissue characterization. \par Will coordinate in [facility]\par \par ischemic cardiomyopathy, non specific BNP \par relative tachycardia, improved \par history of diabetes \par normal lpa\par elevated crp, improved\par elevated ca++, improved\par dyslipidemia with low HDL\par \par off NOAC\par Now on DAPT \par High intensity statin therapy \par continue metoprolol / ACE-I\par BP/lipids well controlled\par continue current medical therapy, will consider uptitration of beta blocker based on test results\par \par Primary care for local health maintenance and management of noncardiovascular issues\par Plan for followup our office after above testing. \par \par Sincerely,\par \par JAZMIN Brasher\par Patients history, testing, and plan reviewed with supervising MD: Dr. Pb Reyes \par \par

## 2022-03-15 NOTE — ADDENDUM
[FreeTextEntry1] : Please note the patient was seen and examined with NP Merari Connor\par I was physically present during the service of the patient and personally examined the patient. \vicki I was directly involved in the management plan and recommendations of the care provided to the patient. \par I personally reviewed the history and physical examination as documented by the NP above.\par 03/14/2022\par

## 2022-03-15 NOTE — HISTORY OF PRESENT ILLNESS
[FreeTextEntry1] : ALLEN SLAUGHTER  is a 56 year old  M\par \par CAD s/p CABG 8/2020 Dr. Plasencia, now s/p dLAD PCI 9/2021\par History of hyperlipidemia non-insulin-dependent diabetes \par Family history of cardiovascular disease \par There is no history of symptomatic congestive heart failure rheumatic heart disease or valvular disease.\par There is no history of symptomatic arrhythmias including atrial fibrillation.\par \par Detailed hx marked change in his symptoms reduction in exercise tolerance which essentially progressed to symptoms at rest \par Seen at [facility], angiography multivessel disease and transferred to [facility] \par Underwent bypass grafting, endarterectomy of left anterior descending artery three-vessel bypass LIMA to LAD saphenous vein graft to obtuse marginal saphenous vein graft to PDA endarterectomy of LAD August 20, 2020\par Discharged after 5 days \par \par In followup he reported dyspnea and fatigue, reduction in exercise tolerance. NST was performed and showed distal anterior ischemia, he underwent high risk PCI at [facility] via LIMA to dLAD 6/2021. Remains on DAPT and all medical rx. \par \par There is no exertional chest pain, pressure or discomfort. \par There is no significant dyspnea on exertion or orthopnea. \par There is no syncope.\par \par he lives and works in [facility] \par he was doing cardiac rehab at [facility]  but stopped d/t PVCs, monitor was performed\par event monitor shows normal sinus rhythm avg 70s and 8% PVC burden. no sustained ectopy\par he is not aware of PVCs\par \par EKG demonstrates normal sinus rhythm with an incomplete right bundle branch block \par April 2021 potassium 4.4 total cholesterol 136 LDL 72 A1c 6.2 LP(a) within normal limits hemoglobin 11.9 creatinine 1.2\par Monitor demonstrates sinus rhythm, premature beats, no atrial fibrillation \par echocardiogram demonstrates normal left ventricular function \par cardiac catheterization demonstrated triple-vessel disease \par \par

## 2022-03-16 RX ORDER — ATORVASTATIN CALCIUM 80 MG/1
80 TABLET, FILM COATED ORAL DAILY
Qty: 90 | Refills: 0 | Status: DISCONTINUED | COMMUNITY
Start: 2020-08-26 | End: 2022-03-16

## 2022-05-17 ENCOUNTER — TRANSCRIPTION ENCOUNTER (OUTPATIENT)
Age: 57
End: 2022-05-17

## 2022-06-07 ENCOUNTER — NON-APPOINTMENT (OUTPATIENT)
Age: 57
End: 2022-06-07

## 2022-07-25 ENCOUNTER — APPOINTMENT (OUTPATIENT)
Dept: CARDIOLOGY | Facility: CLINIC | Age: 57
End: 2022-07-25

## 2022-07-25 VITALS
WEIGHT: 201 LBS | SYSTOLIC BLOOD PRESSURE: 110 MMHG | HEIGHT: 70 IN | TEMPERATURE: 97.3 F | HEART RATE: 68 BPM | BODY MASS INDEX: 28.77 KG/M2 | DIASTOLIC BLOOD PRESSURE: 78 MMHG | OXYGEN SATURATION: 99 %

## 2022-07-25 PROCEDURE — 99214 OFFICE O/P EST MOD 30 MIN: CPT

## 2022-07-25 RX ORDER — METFORMIN HYDROCHLORIDE 1000 MG/1
1000 TABLET, COATED ORAL
Qty: 60 | Refills: 0 | Status: DISCONTINUED | COMMUNITY
Start: 2020-08-26 | End: 2022-07-25

## 2022-07-25 RX ORDER — METFORMIN HYDROCHLORIDE 1000 MG/1
1000 TABLET, COATED ORAL TWICE DAILY
Qty: 180 | Refills: 0 | Status: DISCONTINUED | COMMUNITY
Start: 2022-03-18 | End: 2022-07-25

## 2022-08-03 ENCOUNTER — NON-APPOINTMENT (OUTPATIENT)
Age: 57
End: 2022-08-03

## 2022-08-08 NOTE — ASSESSMENT
[FreeTextEntry1] : monitoring of left ventricular function and burden of ectopy will be performed \par Follow-up blood work has been requested.  \par Metformin has been refilled.  Discussed follow-up with primary physician.  \par Discussed the importance of control of atherosclerotic risk factors to reduce risk of long-term cardiovascular event.  \par Emphasized importance of compliance with medical therapy.  \par \par Severe triple-vessel disease s/p CABG then subsequent high risk PCI 9/2021\par Progressive reduction in exercise tolerance prior to revascularization. \par Now with improvement in stamina, no angina, but with PVCs  No sustained dysrhythmia noted. Reviewed monitor results with patient. \par s/p cardiac rehab \par reviewed stress CMR \par \par ischemic cardiomyopathy, non specific BNP \par relative tachycardia, improved \par history of diabetes \par normal lpa\par elevated crp, improved\par elevated ca++, improved\par dyslipidemia with low HDL\par \par off NOAC\par Now on DAPT \par High intensity statin therapy \par continue metoprolol / ACE-I\par BP/lipids well controlled\par \par Primary care for local health maintenance and management of noncardiovascular issues\par

## 2022-08-08 NOTE — HISTORY OF PRESENT ILLNESS
[FreeTextEntry1] : ALLEN SLAUGHTER  is a 56 year old  M\par \par CAD s/p CABG 8/2020 Dr. Plasencia, s/p dLAD PCI 9/2021\par History of hyperlipidemia non-insulin-dependent diabetes \par Family history of cardiovascular disease \par There is no history of symptomatic congestive heart failure rheumatic heart disease or valvular disease.\par There is no history of symptomatic arrhythmias including atrial fibrillation.\par \par Detailed hx marked change in his symptoms reduction in exercise tolerance which essentially progressed to symptoms at rest \par Seen at [facility], angiography multivessel disease and transferred to [facility] \par Underwent bypass grafting, endarterectomy of left anterior descending artery three-vessel bypass LIMA to LAD saphenous vein graft to obtuse marginal saphenous vein graft to PDA endarterectomy of LAD August 20, 2020\par Discharged after 5 days \par \par In followup he reported dyspnea and fatigue, reduction in exercise tolerance. NST was performed and showed distal anterior ischemia, he underwent high risk PCI at [facility] via LIMA to dLAD 6/2021. Remains on DAPT and all medical rx. \par \par There is no exertional chest pain, pressure or discomfort. \par There is no significant dyspnea on exertion or orthopnea. \par There is no syncope.\par \par he lives and works in [facility] \par he was doing cardiac rehab at [facility]  but stopped d/t PVCs, monitor was performed\par event monitor shows normal sinus rhythm avg 70s and 8% PVC burden. no sustained ectopy\par he is not aware of PVCs\par \par recent MRI and blood work has been reviewed.  \par Cardiac rehab is notable for asymptomatic ventricular ectopy.  \par recently he has not attended the [employer] game.  \par He is physically active walking over 10,000 steps.  \par He did have an episode where he had ran out of metoprolol and felt his heart racing. \par He completed cardiac rehab program. \par He has been taking his wife's SGLT2 inhibitor? \par \par Blood work March 2022 hemoglobin 12.8 creatinine 1.6  A1c 7.5 at that time recommended addition of Zetia.  He never started the new medication.  \par cardiac MRI from June 2022 has no ischemia or scarring normal left ventricular function \par EKG demonstrates normal sinus rhythm with an incomplete right bundle branch block \par Monitor demonstrates sinus rhythm, premature beats, no atrial fibrillation \par echocardiogram demonstrates normal left ventricular function \par cardiac catheterization demonstrated triple-vessel disease

## 2022-11-01 ENCOUNTER — RX RENEWAL (OUTPATIENT)
Age: 57
End: 2022-11-01

## 2022-12-18 ENCOUNTER — RESULT CHARGE (OUTPATIENT)
Age: 57
End: 2022-12-18

## 2022-12-19 ENCOUNTER — APPOINTMENT (OUTPATIENT)
Dept: CARDIOLOGY | Facility: CLINIC | Age: 57
End: 2022-12-19

## 2022-12-19 ENCOUNTER — NON-APPOINTMENT (OUTPATIENT)
Age: 57
End: 2022-12-19

## 2022-12-19 VITALS
DIASTOLIC BLOOD PRESSURE: 68 MMHG | SYSTOLIC BLOOD PRESSURE: 100 MMHG | HEIGHT: 70 IN | TEMPERATURE: 97.3 F | BODY MASS INDEX: 28.77 KG/M2 | OXYGEN SATURATION: 99 % | HEART RATE: 74 BPM | WEIGHT: 201 LBS

## 2022-12-19 PROCEDURE — 93000 ELECTROCARDIOGRAM COMPLETE: CPT

## 2022-12-19 PROCEDURE — 99214 OFFICE O/P EST MOD 30 MIN: CPT | Mod: 25

## 2022-12-19 RX ORDER — CLOPIDOGREL BISULFATE 75 MG/1
75 TABLET, FILM COATED ORAL DAILY
Qty: 90 | Refills: 3 | Status: DISCONTINUED | COMMUNITY
Start: 2021-01-19 | End: 2022-12-19

## 2022-12-19 RX ORDER — LISINOPRIL 2.5 MG/1
2.5 TABLET ORAL DAILY
Qty: 90 | Refills: 3 | Status: DISCONTINUED | COMMUNITY
Start: 2021-09-23 | End: 2022-12-19

## 2022-12-19 RX ORDER — EZETIMIBE 10 MG/1
10 TABLET ORAL
Qty: 90 | Refills: 3 | Status: DISCONTINUED | COMMUNITY
Start: 2022-03-16 | End: 2022-12-19

## 2022-12-19 NOTE — ASSESSMENT
[FreeTextEntry1] : Blood work has been requested.  \par Blood pressure is within normal limits.  \par Increase aerobic exercise.  \par Follow-up echocardiogram and monitor.\par monitoring of left ventricular function and burden of ectopy \par Discussed the importance of control of atherosclerotic risk factors to reduce risk of long-term cardiovascular event.  \par Emphasized importance of compliance with medical therapy.  \par \par Severe triple-vessel disease s/p CABG then subsequent high risk PCI 9/2021\par Progressive reduction in exercise tolerance prior to revascularization. \par s/p cardiac rehab \par reviewed stress CMR \par \par ischemic cardiomyopathy, non specific BNP \par history of diabetes \par normal lpa\par elevated crp, improved\par elevated ca++, improved\par dyslipidemia with low HDL\par \par off NOAC\par Now on APT \par High intensity statin therapy \par continue metoprolol \par \par Primary care for local health maintenance and management of noncardiovascular issues\par

## 2022-12-19 NOTE — HISTORY OF PRESENT ILLNESS
[FreeTextEntry1] : ALLEN SLAUGHTER  is a 57 year old  M\par \par \par CAD s/p CABG 8/2020 Dr. Plasencia, s/p dLAD PCI 9/2021\par History of hyperlipidemia non-insulin-dependent diabetes \par Family history of cardiovascular disease \par There is no history of symptomatic congestive heart failure rheumatic heart disease or valvular disease.\par There is no history of atrial fibrillation.\par \par Detailed hx marked change in his symptoms reduction in exercise tolerance which essentially progressed to symptoms at rest \par Seen at [facility], angiography multivessel disease and transferred to [facility] \par Underwent bypass grafting, endarterectomy of left anterior descending artery three-vessel bypass LIMA to LAD saphenous vein graft to obtuse marginal saphenous vein graft to PDA endarterectomy of LAD August 20, 2020\par Discharged after 5 days \par \par In followup he reported dyspnea and fatigue, reduction in exercise tolerance. \par NST was performed and showed distal anterior ischemia, he underwent high risk PCI at [facility] via LIMA to dLAD 6/2021. \par \par There is no exertional chest pain, pressure or discomfort. \par There is no significant dyspnea on exertion or orthopnea. \par There is no syncope.\par \par he lives and works in [facility] \par he was doing cardiac rehab at [facility] but stopped d/t PVCs\par event monitor shows normal sinus rhythm avg 70s and 8% PVC burden. no sustained ectopy\par he is not aware of PVCs\par \par today he returns the office and has stopped some of his medications.  He is now off clopidogrel Zetia and lisinopril. \par \par Blood work July 2022 has a creatinine 1.7 A1c 7.2 LDL 67\par cardiac MRI from June 2022 has no ischemia or scarring normal left ventricular function \par Monitor demonstrates sinus rhythm, premature beats, no atrial fibrillation \par echocardiogram demonstrates normal left ventricular function \par cardiac catheterization demonstrated triple-vessel disease

## 2023-02-16 ENCOUNTER — NON-APPOINTMENT (OUTPATIENT)
Age: 58
End: 2023-02-16

## 2023-04-10 ENCOUNTER — APPOINTMENT (OUTPATIENT)
Dept: CARDIOLOGY | Facility: CLINIC | Age: 58
End: 2023-04-10

## 2023-05-10 NOTE — HISTORY OF PRESENT ILLNESS
[FreeTextEntry1] : ALLEN SLAUGHTER  is a 57 year old  M\par \par \par CAD s/p CABG 8/2020 Dr. Plasencia, s/p dLAD PCI 9/2021\par History of hyperlipidemia non-insulin-dependent diabetes \par Family history of cardiovascular disease \par There is no history of symptomatic congestive heart failure rheumatic heart disease or valvular disease.\par There is no history of atrial fibrillation.\par \par Detailed hx marked change in his symptoms reduction in exercise tolerance which essentially progressed to symptoms at rest \par Seen at [facility], angiography multivessel disease and transferred to [facility] \par Underwent bypass grafting, endarterectomy of left anterior descending artery three-vessel bypass LIMA to LAD saphenous vein graft to obtuse marginal saphenous vein graft to PDA endarterectomy of LAD August 20, 2020\par Discharged after 5 days \par \par In followup he reported dyspnea and fatigue, reduction in exercise tolerance. \par NST was performed and showed distal anterior ischemia, he underwent high risk PCI at [facility] via LIMA to dLAD 6/2021. \par \par There is no exertional chest pain, pressure or discomfort. \par There is no significant dyspnea on exertion or orthopnea. \par There is no syncope.\par \par he lives and works in [facility] \par he was doing cardiac rehab at [facility] but stopped d/t PVCs\par event monitor shows normal sinus rhythm avg 70s and 8% PVC burden. no sustained ectopy\par he is not aware of PVCs\par \par today he returns the office and has stopped some of his medications.  He is now off clopidogrel Zetia and lisinopril. \par \par Blood work July 2022 has a creatinine 1.7 A1c 7.2 LDL 67\par cardiac MRI from June 2022 has no ischemia or scarring normal left ventricular function \par Monitor demonstrates sinus rhythm, premature beats, no atrial fibrillation \par echocardiogram demonstrates normal left ventricular function \par cardiac catheterization demonstrated triple-vessel disease \par \par Blood work has been requested.  \par Blood pressure is within normal limits.  \par Increase aerobic exercise.  \par Follow-up echocardiogram and monitor.\par monitoring of left ventricular function and burden of ectopy \par Discussed the importance of control of atherosclerotic risk factors to reduce risk of long-term cardiovascular event.  \par Emphasized importance of compliance with medical therapy.  \par \par Severe triple-vessel disease s/p CABG then subsequent high risk PCI 9/2021\par Progressive reduction in exercise tolerance prior to revascularization. \par s/p cardiac rehab \par reviewed stress CMR \par \par ischemic cardiomyopathy, non specific BNP \par history of diabetes \par normal lpa\par elevated crp, improved\par elevated ca++, improved\par dyslipidemia with low HDL\par \par off NOAC\par Now on APT \par High intensity statin therapy \par continue metoprolol \par \par Primary care for local health maintenance and management of noncardiovascular issues\par

## 2023-05-17 ENCOUNTER — APPOINTMENT (OUTPATIENT)
Dept: CARDIOLOGY | Facility: CLINIC | Age: 58
End: 2023-05-17

## 2023-06-12 ENCOUNTER — RX RENEWAL (OUTPATIENT)
Age: 58
End: 2023-06-12

## 2023-07-27 ENCOUNTER — APPOINTMENT (OUTPATIENT)
Dept: CARDIOLOGY | Facility: CLINIC | Age: 58
End: 2023-07-27
Payer: COMMERCIAL

## 2023-07-27 PROCEDURE — 93880 EXTRACRANIAL BILAT STUDY: CPT

## 2023-07-27 PROCEDURE — 93242 EXT ECG>48HR<7D RECORDING: CPT

## 2023-07-27 PROCEDURE — 93306 TTE W/DOPPLER COMPLETE: CPT

## 2023-08-14 ENCOUNTER — APPOINTMENT (OUTPATIENT)
Dept: CARDIOLOGY | Facility: CLINIC | Age: 58
End: 2023-08-14
Payer: COMMERCIAL

## 2023-08-14 PROCEDURE — 93015 CV STRESS TEST SUPVJ I&R: CPT

## 2023-08-14 PROCEDURE — A9502: CPT

## 2023-08-14 PROCEDURE — 78452 HT MUSCLE IMAGE SPECT MULT: CPT

## 2023-08-15 ENCOUNTER — NON-APPOINTMENT (OUTPATIENT)
Age: 58
End: 2023-08-15

## 2023-08-21 ENCOUNTER — APPOINTMENT (OUTPATIENT)
Dept: CARDIOLOGY | Facility: CLINIC | Age: 58
End: 2023-08-21
Payer: COMMERCIAL

## 2023-08-21 VITALS
OXYGEN SATURATION: 98 % | DIASTOLIC BLOOD PRESSURE: 68 MMHG | HEART RATE: 62 BPM | WEIGHT: 205 LBS | HEIGHT: 70 IN | SYSTOLIC BLOOD PRESSURE: 104 MMHG | BODY MASS INDEX: 29.35 KG/M2

## 2023-08-21 DIAGNOSIS — I25.10 ATHEROSCLEROTIC HEART DISEASE OF NATIVE CORONARY ARTERY W/OUT ANGINA PECTORIS: ICD-10-CM

## 2023-08-21 DIAGNOSIS — Z95.1 PRESENCE OF AORTOCORONARY BYPASS GRAFT: ICD-10-CM

## 2023-08-21 DIAGNOSIS — I49.3 VENTRICULAR PREMATURE DEPOLARIZATION: ICD-10-CM

## 2023-08-21 PROCEDURE — 99214 OFFICE O/P EST MOD 30 MIN: CPT

## 2023-08-21 RX ORDER — AMOXICILLIN 500 MG/1
500 CAPSULE ORAL
Qty: 21 | Refills: 0 | Status: DISCONTINUED | COMMUNITY
Start: 2022-12-16 | End: 2023-08-21

## 2023-08-24 NOTE — HISTORY OF PRESENT ILLNESS
[FreeTextEntry1] : ALLEN SLAUGHTER  is a 57 year old  M  CAD s/p CABG 8/2020 Dr. Plasencia, s/p dLAD PCI 9/2021 History of hyperlipidemia non-insulin-dependent diabetes  Family history of cardiovascular disease  There is no history of symptomatic congestive heart failure rheumatic heart disease or valvular disease. There is no history of atrial fibrillation.  Detailed hx marked change in his symptoms reduction in exercise tolerance which essentially progressed to symptoms at rest  Seen at [facility], angiography multivessel disease and transferred to [facility]  Underwent bypass grafting, endarterectomy of left anterior descending artery three-vessel bypass LIMA to LAD saphenous vein graft to obtuse marginal saphenous vein graft to PDA endarterectomy of LAD August 20, 2020 Discharged after 5 days   In followup he reported dyspnea and fatigue, reduction in exercise tolerance.  NST was performed and showed distal anterior ischemia, he underwent high risk PCI at [facility] via LIMA to dLAD 6/2021.   There is no exertional chest pain, pressure or discomfort.  There is no significant dyspnea on exertion or orthopnea.  There is no syncope.  he lives and works in [facility]  he was doing cardiac rehab at [facility] but stopped d/t PVCs event monitor shows normal sinus rhythm avg 70s and 8% PVC burden. no sustained ectopy he is not aware of PVCs  Returns to review recent noninvasive testing.    Echocardiogram has normal left ventricular function mild valvular heart disease  carotid Doppler has mild atherosclerosis  blood work February 2023 hemoglobin 12.9 creatinine 1.3 total cholesterol 116 LDL 40 A1c 7.0 hemoglobin 13.0 calcium 10.8 HDL 39 LDL 65 A1c 7.7 TSH 4.4 LP(a) 40  creatinine 1.4 monitor demonstrated sinus rhythm with no sustained ectopy brief NSVT PVC burden less than 2%  subsequently he was scheduled for stress test no ischemia concomitant attenuation possible infarct normal left ventricular function cardiac MRI from June 2022 has no ischemia or scarring normal left ventricular function   cardiac catheterization demonstrated triple-vessel disease

## 2023-08-25 ENCOUNTER — TRANSCRIPTION ENCOUNTER (OUTPATIENT)
Age: 58
End: 2023-08-25

## 2023-08-25 RX ORDER — EZETIMIBE 10 MG/1
10 TABLET ORAL
Qty: 90 | Refills: 3 | Status: ACTIVE | COMMUNITY
Start: 2023-08-25 | End: 1900-01-01

## 2023-10-23 ENCOUNTER — TRANSCRIPTION ENCOUNTER (OUTPATIENT)
Age: 58
End: 2023-10-23

## 2023-11-29 ENCOUNTER — APPOINTMENT (OUTPATIENT)
Dept: ENDOCRINOLOGY | Facility: CLINIC | Age: 58
End: 2023-11-29
Payer: COMMERCIAL

## 2023-11-29 VITALS
DIASTOLIC BLOOD PRESSURE: 78 MMHG | WEIGHT: 205 LBS | HEIGHT: 70 IN | SYSTOLIC BLOOD PRESSURE: 123 MMHG | HEART RATE: 73 BPM | BODY MASS INDEX: 29.35 KG/M2

## 2023-11-29 LAB
GLUCOSE BLDC GLUCOMTR-MCNC: 131
HBA1C MFR BLD HPLC: 7.8

## 2023-11-29 PROCEDURE — 83036 HEMOGLOBIN GLYCOSYLATED A1C: CPT | Mod: QW

## 2023-11-29 PROCEDURE — 99205 OFFICE O/P NEW HI 60 MIN: CPT | Mod: 25

## 2023-11-29 PROCEDURE — 82962 GLUCOSE BLOOD TEST: CPT

## 2023-12-13 LAB
CREAT SPEC-SCNC: 102 MG/DL
MICROALBUMIN 24H UR DL<=1MG/L-MCNC: 14.8 MG/DL
MICROALBUMIN/CREAT 24H UR-RTO: 146 MG/G

## 2024-03-01 ENCOUNTER — RX RENEWAL (OUTPATIENT)
Age: 59
End: 2024-03-01

## 2024-03-05 ENCOUNTER — APPOINTMENT (OUTPATIENT)
Dept: ENDOCRINOLOGY | Facility: CLINIC | Age: 59
End: 2024-03-05
Payer: COMMERCIAL

## 2024-03-05 VITALS
HEART RATE: 72 BPM | BODY MASS INDEX: 28.35 KG/M2 | DIASTOLIC BLOOD PRESSURE: 82 MMHG | WEIGHT: 198 LBS | SYSTOLIC BLOOD PRESSURE: 118 MMHG | HEIGHT: 70 IN

## 2024-03-05 LAB — GLUCOSE BLDC GLUCOMTR-MCNC: 124

## 2024-03-05 PROCEDURE — 36415 COLL VENOUS BLD VENIPUNCTURE: CPT

## 2024-03-05 PROCEDURE — 99215 OFFICE O/P EST HI 40 MIN: CPT | Mod: 25

## 2024-03-05 PROCEDURE — 82962 GLUCOSE BLOOD TEST: CPT

## 2024-03-05 PROCEDURE — 83036 HEMOGLOBIN GLYCOSYLATED A1C: CPT | Mod: QW

## 2024-03-05 RX ORDER — METFORMIN HYDROCHLORIDE 1000 MG/1
1000 TABLET, COATED ORAL TWICE DAILY
Qty: 180 | Refills: 2 | Status: ACTIVE | COMMUNITY
Start: 2022-07-25 | End: 1900-01-01

## 2024-03-07 NOTE — HISTORY OF PRESENT ILLNESS
[FreeTextEntry1] : CAD s/p CABG 8/2020 Dr. Plasencia, s/p dLAD PCI 9/2021 History of hyperlipidemia non-insulin-dependent diabetes Family history of cardiovascular disease here for initial evaluation and management of diabetes generally feels well and endorses no acute complaints. reports diagnosis ~ 4 y/a, has always been on pills, reports fair control over the years, was unaware of CKD. no past insulin use. recent A1Cs have risen. admits to sedentary lifestyle and worsening dietary indiscretions. compliant w/ metformin 1 gm BID, no other meds used in the past.   3/2024 Here for /fu, generally feels well and endorses no acute complaints. No interval events since LV. Today reports adequate tolerance and intake of SGLT-2 inh, no LUTS. interval COVID infection, uncomplicated. he otherwise denies any f/c, CP, SOB, palpitations, tremors, depressed mood, anxiety, palpitations, n/v, stool/urinary abn, skin/weight changes, heat/cold intolerance, HAs, breast/nipple changes, polyuria/polydipsia/nocturia or other complaints.

## 2024-03-07 NOTE — ASSESSMENT
[Long Term Vascular Complications] : long term vascular complications of diabetes [Carbohydrate Consistent Diet] : carbohydrate consistent diet [Weight Loss] : weight loss [Importance of Diet and Exercise] : importance of diet and exercise to improve glycemic control, achieve weight loss and improve cardiovascular health [FreeTextEntry1] : 1) DM2: Uncontrolled, A1C on  target of <7%. Natural hx of the disease and importance of treatment targets discussed at length, he verbalized understanding. ADA diet and importance of exercise discussed at length. Plan is to cont metformi to full dose and introduce jardiance 10 mg QD, r/b/a and s/e including LUTS/UTI reviewed. , titration instructions provided. Refer to Nutritionist today. We russell check microalbumin, lipids and labs on the NV. Discuss vaccines and podiatry/opthalmology referrals on NV   2) Weight gain:  complicated by DM2. Discussed medical  strategies. Pt would like to try lifestyle modifications and jardiance therapy at this time. Reassess on the NV for at least ~5% TBW loss.  3) Essential HTN: Pt is at goal BP and not on an ACE inh. Reassess microalbumin prior to the NV.   4) Dyslipidemia: Pt is on a high intensity statin. . REassess lipids on the next visit. LDL target <100.

## 2024-03-11 ENCOUNTER — APPOINTMENT (OUTPATIENT)
Dept: CARDIOLOGY | Facility: CLINIC | Age: 59
End: 2024-03-11

## 2024-03-11 ENCOUNTER — TRANSCRIPTION ENCOUNTER (OUTPATIENT)
Age: 59
End: 2024-03-11

## 2024-03-15 LAB
ALBUMIN SERPL ELPH-MCNC: 4.9 G/DL
ALP BLD-CCNC: 66 U/L
ALT SERPL-CCNC: 43 U/L
ANION GAP SERPL CALC-SCNC: 17 MMOL/L
AST SERPL-CCNC: 36 U/L
BILIRUB SERPL-MCNC: 0.5 MG/DL
BUN SERPL-MCNC: 20 MG/DL
CALCIUM SERPL-MCNC: 10.4 MG/DL
CHLORIDE SERPL-SCNC: 104 MMOL/L
CHOLEST SERPL-MCNC: 84 MG/DL
CO2 SERPL-SCNC: 23 MMOL/L
CREAT SERPL-MCNC: 1.34 MG/DL
CREAT SPEC-SCNC: 105 MG/DL
EGFR: 62 ML/MIN/1.73M2
FOLATE SERPL-MCNC: >20 NG/ML
GLUCOSE SERPL-MCNC: 107 MG/DL
HBA1C MFR BLD HPLC: 7.1
HDLC SERPL-MCNC: 36 MG/DL
LDLC SERPL CALC-MCNC: 25 MG/DL
MICROALBUMIN 24H UR DL<=1MG/L-MCNC: 8.7 MG/DL
MICROALBUMIN/CREAT 24H UR-RTO: 83 MG/G
NONHDLC SERPL-MCNC: 48 MG/DL
POTASSIUM SERPL-SCNC: 4.9 MMOL/L
PROT SERPL-MCNC: 7.3 G/DL
SODIUM SERPL-SCNC: 144 MMOL/L
T4 FREE SERPL-MCNC: 1.3 NG/DL
TRIGL SERPL-MCNC: 133 MG/DL
TSH SERPL-ACNC: 3.8 UIU/ML
VIT B12 SERPL-MCNC: 611 PG/ML

## 2024-03-19 ENCOUNTER — RX RENEWAL (OUTPATIENT)
Age: 59
End: 2024-03-19

## 2024-03-19 RX ORDER — ROSUVASTATIN CALCIUM 40 MG/1
40 TABLET, FILM COATED ORAL DAILY
Qty: 90 | Refills: 3 | Status: ACTIVE | COMMUNITY
Start: 2022-03-16 | End: 1900-01-01

## 2024-03-19 RX ORDER — METOPROLOL TARTRATE 50 MG/1
50 TABLET, FILM COATED ORAL
Qty: 180 | Refills: 3 | Status: ACTIVE | COMMUNITY
Start: 2020-10-17 | End: 1900-01-01

## 2024-06-13 ENCOUNTER — APPOINTMENT (OUTPATIENT)
Dept: ENDOCRINOLOGY | Facility: CLINIC | Age: 59
End: 2024-06-13
Payer: COMMERCIAL

## 2024-06-13 VITALS
HEART RATE: 69 BPM | WEIGHT: 202 LBS | SYSTOLIC BLOOD PRESSURE: 133 MMHG | BODY MASS INDEX: 28.98 KG/M2 | DIASTOLIC BLOOD PRESSURE: 86 MMHG

## 2024-06-13 DIAGNOSIS — N18.31 CHRONIC KIDNEY DISEASE, STAGE 3A: ICD-10-CM

## 2024-06-13 DIAGNOSIS — E78.5 HYPERLIPIDEMIA, UNSPECIFIED: ICD-10-CM

## 2024-06-13 DIAGNOSIS — R68.82 DECREASED LIBIDO: ICD-10-CM

## 2024-06-13 DIAGNOSIS — E11.9 TYPE 2 DIABETES MELLITUS W/OUT COMPLICATIONS: ICD-10-CM

## 2024-06-13 LAB
GLUCOSE BLDC GLUCOMTR-MCNC: 153
HBA1C MFR BLD HPLC: 7.2

## 2024-06-13 PROCEDURE — 99215 OFFICE O/P EST HI 40 MIN: CPT | Mod: 25

## 2024-06-13 PROCEDURE — 83036 HEMOGLOBIN GLYCOSYLATED A1C: CPT | Mod: QW

## 2024-06-13 RX ORDER — EMPAGLIFLOZIN 25 MG/1
25 TABLET, FILM COATED ORAL
Qty: 90 | Refills: 1 | Status: ACTIVE | COMMUNITY
Start: 2023-11-29 | End: 1900-01-01

## 2024-06-13 NOTE — HISTORY OF PRESENT ILLNESS
[FreeTextEntry1] : CAD s/p CABG 8/2020 Dr. Plasencia, s/p dLAD PCI 9/2021 History of hyperlipidemia non-insulin-dependent diabetes Family history of cardiovascular disease here for initial evaluation and management of diabetes generally feels well and endorses no acute complaints. reports diagnosis ~ 4 y/a, has always been on pills, reports fair control over the years, was unaware of CKD. no past insulin use. recent A1Cs have risen. admits to sedentary lifestyle and worsening dietary indiscretions. compliant w/ metformin 1 gm BID, no other meds used in the past.   6/2024 Here for /fu, generally feels well and endorses no acute complaints. No interval events since LV. Today reports adequate tolerance and intake of SGLT-2 inh, no LUTS. + Metformin max dose. he otherwise denies any f/c, CP, SOB, palpitations, tremors, depressed mood, anxiety, palpitations, n/v, stool/urinary abn, skin/weight changes, heat/cold intolerance, HAs, breast/nipple changes, polyuria/polydipsia/nocturia or other complaints.

## 2024-06-13 NOTE — ASSESSMENT
[Long Term Vascular Complications] : long term vascular complications of diabetes [Carbohydrate Consistent Diet] : carbohydrate consistent diet [Importance of Diet and Exercise] : importance of diet and exercise to improve glycemic control, achieve weight loss and improve cardiovascular health [Weight Loss] : weight loss [FreeTextEntry1] : 1) DM2: Uncontrolled, A1C on  target of <7%. Natural hx of the disease and importance of treatment targets discussed at length, he verbalized understanding. ADA diet and importance of exercise discussed at length. Plan is to cont metformi to full dose and increase jardiance to 25 mg QD, r/b/a and s/e including LUTS/UTI reviewed. , titration instructions provided. Refer to Nutritionist today. We russell check microalbumin, lipids and labs on the NV. Discuss vaccines and podiatry/opthalmology referrals on NV. pt will consider mounjaro initiation on the NV.  2) Weight gain:  complicated by DM2. Discussed medical  strategies. Pt would like to try lifestyle modifications and jardiance therapy at this time. Reassess on the NV for at least ~5% TBW loss.  3) Essential HTN: Pt is at goal BP and not on an ACE inh. Reassess microalbumin prior to the NV.   4) Dyslipidemia: Pt is on a high intensity statin. . REassess lipids on the next visit. LDL target <100.

## 2024-06-21 LAB
ALBUMIN SERPL ELPH-MCNC: 4.5 G/DL
ALP BLD-CCNC: 66 U/L
ALT SERPL-CCNC: 27 U/L
ANION GAP SERPL CALC-SCNC: 14 MMOL/L
AST SERPL-CCNC: 28 U/L
BILIRUB SERPL-MCNC: 0.5 MG/DL
BUN SERPL-MCNC: 21 MG/DL
CALCIUM SERPL-MCNC: 9.8 MG/DL
CHLORIDE SERPL-SCNC: 105 MMOL/L
CO2 SERPL-SCNC: 23 MMOL/L
CREAT SERPL-MCNC: 1.48 MG/DL
EGFR: 55 ML/MIN/1.73M2
ESTRADIOL SERPL-MCNC: 29 PG/ML
ESTRONE SERPL-MCNC: 57 PG/ML
FSH SERPL-MCNC: 7.1 IU/L
GLUCOSE SERPL-MCNC: 155 MG/DL
POTASSIUM SERPL-SCNC: 5 MMOL/L
PROLACTIN SERPL-MCNC: 5.8 NG/ML
PROLACTIN SERPL-MCNC: 6.3 NG/ML
PROT SERPL-MCNC: 6.9 G/DL
SODIUM SERPL-SCNC: 142 MMOL/L
T4 FREE SERPL-MCNC: 1.3 NG/DL
TESTOST FREE SERPL-MCNC: 2 PG/ML
TESTOST SERPL-MCNC: 363 NG/DL
TSH SERPL-ACNC: 3.08 UIU/ML

## 2024-08-02 ENCOUNTER — NON-APPOINTMENT (OUTPATIENT)
Age: 59
End: 2024-08-02

## 2024-08-02 ENCOUNTER — APPOINTMENT (OUTPATIENT)
Dept: CARDIOLOGY | Facility: CLINIC | Age: 59
End: 2024-08-02
Payer: COMMERCIAL

## 2024-08-02 VITALS
BODY MASS INDEX: 29.35 KG/M2 | HEIGHT: 70 IN | SYSTOLIC BLOOD PRESSURE: 116 MMHG | DIASTOLIC BLOOD PRESSURE: 76 MMHG | HEART RATE: 64 BPM | WEIGHT: 205 LBS | OXYGEN SATURATION: 98 %

## 2024-08-02 DIAGNOSIS — I25.10 ATHEROSCLEROTIC HEART DISEASE OF NATIVE CORONARY ARTERY W/OUT ANGINA PECTORIS: ICD-10-CM

## 2024-08-02 DIAGNOSIS — I49.3 VENTRICULAR PREMATURE DEPOLARIZATION: ICD-10-CM

## 2024-08-02 DIAGNOSIS — E78.5 HYPERLIPIDEMIA, UNSPECIFIED: ICD-10-CM

## 2024-08-02 DIAGNOSIS — R07.89 OTHER CHEST PAIN: ICD-10-CM

## 2024-08-02 DIAGNOSIS — Z95.1 PRESENCE OF AORTOCORONARY BYPASS GRAFT: ICD-10-CM

## 2024-08-02 DIAGNOSIS — E11.9 TYPE 2 DIABETES MELLITUS W/OUT COMPLICATIONS: ICD-10-CM

## 2024-08-02 PROCEDURE — 99214 OFFICE O/P EST MOD 30 MIN: CPT

## 2024-08-02 PROCEDURE — 93000 ELECTROCARDIOGRAM COMPLETE: CPT

## 2024-08-02 NOTE — HISTORY OF PRESENT ILLNESS
[FreeTextEntry1] : ALLEN SLAUGHTER  is a 58 year old  M Infrequently he has episodes of chest tightness.  He wakes up with this.  He attributes this to his exercise program.  He has started doing push-ups and sit ups.  There is discussion of GLP agonist with his endocrinologist.  Follow-up echo and stress echo.  Did discuss GLP agonist for weight loss and cardiovascular risk reduction.Last office visit approximately 1 year ago in the interim he has establish care with endocrinology at that time SGLT2 inhibitor was added to his regimen blood work March 2024 TSH 3.1 total cholesterol 84 LDL 25 creatinine 1.3 labs and endocrinology note reviewed subsequent blood work June 2024 potassium 5.0 creatinine 1.5 there is discussion of using GLP agonist today's EKG demonstrates normal sinus rhythm with an incomplete right bundle branch block this EKG finding is not significantly changed from prior  CAD s/p CABG 8/2020 Dr. Plasencia, s/p dLAD PCI 9/2021 History of hyperlipidemia non-insulin-dependent diabetes ckd Family history of cardiovascular disease   Detailed hx marked change in his symptoms reduction in exercise tolerance which essentially progressed to symptoms at rest  Seen at [facility], angiography multivessel disease and transferred to [facility]  Underwent bypass grafting, endarterectomy of left anterior descending artery three-vessel bypass LIMA to LAD saphenous vein graft to obtuse marginal saphenous vein graft to PDA endarterectomy of LAD August 20, 2020 Discharged after 5 days   In followup he reported dyspnea and fatigue, reduction in exercise tolerance.  NST was performed and showed distal anterior ischemia, he underwent high risk PCI at [facility] via LIMA to dLAD 6/2021.   There is no exertional chest pain, pressure or discomfort.  There is no significant dyspnea on exertion or orthopnea.  There is no syncope.  he lives and works in [facility]  he was doing cardiac rehab at [facility] but stopped d/t PVCs event monitor shows normal sinus rhythm avg 70s and 8% PVC burden. no sustained ectopy he is not aware of PVCs  Echocardiogram normal left ventricular function mild valvular heart disease  carotid Doppler mild atherosclerosis  blood work February 2023 hemoglobin 12.9 creatinine 1.3 total cholesterol 116 LDL 40 A1c 7.0 hemoglobin 13.0 calcium 10.8 HDL 39 LDL 65 A1c 7.7 TSH 4.4 LP(a) 40  creatinine 1.4 monitor demonstrated sinus rhythm with no sustained ectopy brief NSVT PVC burden less than 2%  subsequently he was scheduled for stress test no ischemia concomitant attenuation possible infarct normal left ventricular function cardiac MRI from June 2022 has no ischemia or scarring normal left ventricular function   cardiac catheterization demonstrated triple-vessel disease   Preserved left ventricular function.   Atherosclerosis.   Prior infarct.   Mild valvular heart disease.   Chronic kidney disease.   Diabetes.   PVCs.  NSVT.   Mixed dyslipidemia. Metoprolol and rosuvastatin have been refilled.   Blood pressure is within normal limits.   Increase aerobic exercise.   monitoring of left ventricular function and burden of ectopy  Discussed the importance of control of atherosclerotic risk factors to reduce risk of long-term cardiovascular event.   Emphasized importance of compliance with medical therapy.    Severe triple-vessel disease s/p CABG then subsequent high risk PCI 9/2021 Progressive reduction in exercise tolerance prior to revascularization.  s/p cardiac rehab  reviewed stress CMR   ischemic cardiomyopathy, non specific BNP  history of diabetes  normal lpa elevated crp dyslipidemia with low HDL  off NOAC Now on APT  High intensity statin therapy  continue metoprolol   Primary care for local health maintenance and management of noncardiovascular issues

## 2024-08-02 NOTE — HISTORY OF PRESENT ILLNESS
[FreeTextEntry1] : ALLEN SLAUGHTER  is a 58 year old  M Infrequently he has episodes of chest tightness.  He wakes up with this.  He attributes this to his exercise program.  He has started doing push-ups and sit ups.  There is discussion of GLP agonist with his endocrinologist.  Follow-up echo and stress echo.  Did discuss GLP agonist for weight loss and cardiovascular risk reduction.Last office visit approximately 1 year ago in the interim he has establish care with endocrinology at that time SGLT2 inhibitor was added to his regimen blood work March 2024 TSH 3.1 total cholesterol 84 LDL 25 creatinine 1.3 labs and endocrinology note reviewed subsequent blood work June 2024 potassium 5.0 creatinine 1.5 there is discussion of using GLP agonist today's EKG demonstrates normal sinus rhythm with an incomplete right bundle branch block this EKG finding is not significantly changed from prior  CAD s/p CABG 8/2020 Dr. Plasencia, s/p dLAD PCI 9/2021 History of hyperlipidemia non-insulin-dependent diabetes ckd Family history of cardiovascular disease   Detailed hx marked change in his symptoms reduction in exercise tolerance which essentially progressed to symptoms at rest  Seen at [facility], angiography multivessel disease and transferred to [facility]  Underwent bypass grafting, endarterectomy of left anterior descending artery three-vessel bypass LIMA to LAD saphenous vein graft to obtuse marginal saphenous vein graft to PDA endarterectomy of LAD August 20, 2020 Discharged after 5 days   In followup he reported dyspnea and fatigue, reduction in exercise tolerance.  NST was performed and showed distal anterior ischemia, he underwent high risk PCI at [facility] via LIMA to dLAD 6/2021.   There is no exertional chest pain, pressure or discomfort.  There is no significant dyspnea on exertion or orthopnea.  There is no syncope.  he lives and works in [facility]  he was doing cardiac rehab at [facility] but stopped d/t PVCs event monitor shows normal sinus rhythm avg 70s and 8% PVC burden. no sustained ectopy he is not aware of PVCs  Echocardiogram normal left ventricular function mild valvular heart disease  carotid Doppler mild atherosclerosis  blood work February 2023 hemoglobin 12.9 creatinine 1.3 total cholesterol 116 LDL 40 A1c 7.0 hemoglobin 13.0 calcium 10.8 HDL 39 LDL 65 A1c 7.7 TSH 4.4 LP(a) 40  creatinine 1.4 monitor demonstrated sinus rhythm with no sustained ectopy brief NSVT PVC burden less than 2%  subsequently he was scheduled for stress test no ischemia concomitant attenuation possible infarct normal left ventricular function cardiac MRI from June 2022 has no ischemia or scarring normal left ventricular function   cardiac catheterization demonstrated triple-vessel disease   Preserved left ventricular function.   Atherosclerosis.   Prior infarct.   Mild valvular heart disease.   Chronic kidney disease.   Diabetes.   PVCs.  NSVT.   Mixed dyslipidemia. Metoprolol and rosuvastatin have been refilled.   Blood pressure is within normal limits.   Increase aerobic exercise.   monitoring of left ventricular function and burden of ectopy  Discussed the importance of control of atherosclerotic risk factors to reduce risk of long-term cardiovascular event.   Emphasized importance of compliance with medical therapy.    Severe triple-vessel disease s/p CABG then subsequent high risk PCI 9/2021 Progressive reduction in exercise tolerance prior to revascularization.  s/p cardiac rehab  reviewed stress CMR   ischemic cardiomyopathy, non specific BNP  history of diabetes  normal lpa elevated crp dyslipidemia with low HDL  off NOAC Now on APT  High intensity statin therapy  continue metoprolol   Primary care for local health maintenance and management of noncardiovascular issues

## 2024-08-22 ENCOUNTER — APPOINTMENT (OUTPATIENT)
Dept: CARDIOLOGY | Facility: CLINIC | Age: 59
End: 2024-08-22

## 2024-08-28 ENCOUNTER — APPOINTMENT (OUTPATIENT)
Dept: CARDIOLOGY | Facility: CLINIC | Age: 59
End: 2024-08-28

## 2024-09-06 ENCOUNTER — APPOINTMENT (OUTPATIENT)
Dept: CARDIOLOGY | Facility: CLINIC | Age: 59
End: 2024-09-06

## 2024-09-14 ENCOUNTER — RX RENEWAL (OUTPATIENT)
Age: 59
End: 2024-09-14

## 2024-09-30 ENCOUNTER — TRANSCRIPTION ENCOUNTER (OUTPATIENT)
Age: 59
End: 2024-09-30

## 2024-10-09 ENCOUNTER — NON-APPOINTMENT (OUTPATIENT)
Age: 59
End: 2024-10-09

## 2024-10-09 ENCOUNTER — APPOINTMENT (OUTPATIENT)
Dept: ENDOCRINOLOGY | Facility: CLINIC | Age: 59
End: 2024-10-09
Payer: COMMERCIAL

## 2024-10-09 VITALS
DIASTOLIC BLOOD PRESSURE: 68 MMHG | HEART RATE: 72 BPM | WEIGHT: 198 LBS | SYSTOLIC BLOOD PRESSURE: 96 MMHG | BODY MASS INDEX: 28.41 KG/M2

## 2024-10-09 DIAGNOSIS — N18.31 CHRONIC KIDNEY DISEASE, STAGE 3A: ICD-10-CM

## 2024-10-09 DIAGNOSIS — E78.5 HYPERLIPIDEMIA, UNSPECIFIED: ICD-10-CM

## 2024-10-09 DIAGNOSIS — E11.9 TYPE 2 DIABETES MELLITUS W/OUT COMPLICATIONS: ICD-10-CM

## 2024-10-09 LAB — GLUCOSE BLDC GLUCOMTR-MCNC: 139

## 2024-10-09 PROCEDURE — 99214 OFFICE O/P EST MOD 30 MIN: CPT

## 2024-10-09 PROCEDURE — 36415 COLL VENOUS BLD VENIPUNCTURE: CPT

## 2024-10-09 PROCEDURE — G2211 COMPLEX E/M VISIT ADD ON: CPT | Mod: NC

## 2024-10-09 PROCEDURE — 82962 GLUCOSE BLOOD TEST: CPT

## 2024-10-18 LAB
ALBUMIN SERPL ELPH-MCNC: 4.8 G/DL
ALP BLD-CCNC: 68 U/L
ALT SERPL-CCNC: 48 U/L
ANION GAP SERPL CALC-SCNC: 14 MMOL/L
AST SERPL-CCNC: 49 U/L
BILIRUB SERPL-MCNC: 0.6 MG/DL
BUN SERPL-MCNC: 22 MG/DL
CALCIUM SERPL-MCNC: 10.1 MG/DL
CHLORIDE SERPL-SCNC: 104 MMOL/L
CO2 SERPL-SCNC: 25 MMOL/L
CREAT SERPL-MCNC: 1.61 MG/DL
EGFR: 50 ML/MIN/1.73M2
ESTIMATED AVERAGE GLUCOSE: 160 MG/DL
FOLATE SERPL-MCNC: >20 NG/ML
GLUCOSE SERPL-MCNC: 145 MG/DL
HBA1C MFR BLD HPLC: 7.2 %
POTASSIUM SERPL-SCNC: 4.6 MMOL/L
PROT SERPL-MCNC: 7.1 G/DL
SODIUM SERPL-SCNC: 143 MMOL/L
VIT B12 SERPL-MCNC: 592 PG/ML

## 2024-11-06 ENCOUNTER — TRANSCRIPTION ENCOUNTER (OUTPATIENT)
Age: 59
End: 2024-11-06

## 2024-11-12 ENCOUNTER — APPOINTMENT (OUTPATIENT)
Dept: CARDIOLOGY | Facility: CLINIC | Age: 59
End: 2024-11-12
Payer: COMMERCIAL

## 2024-11-12 PROCEDURE — 93306 TTE W/DOPPLER COMPLETE: CPT

## 2024-11-14 ENCOUNTER — APPOINTMENT (OUTPATIENT)
Dept: CARDIOLOGY | Facility: CLINIC | Age: 59
End: 2024-11-14
Payer: COMMERCIAL

## 2024-11-14 PROCEDURE — 93351 STRESS TTE COMPLETE: CPT

## 2024-11-14 PROCEDURE — 93320 DOPPLER ECHO COMPLETE: CPT

## 2024-11-21 ENCOUNTER — TRANSCRIPTION ENCOUNTER (OUTPATIENT)
Age: 59
End: 2024-11-21

## 2024-11-22 ENCOUNTER — APPOINTMENT (OUTPATIENT)
Dept: CARDIOLOGY | Facility: CLINIC | Age: 59
End: 2024-11-22

## 2024-12-09 ENCOUNTER — RX RENEWAL (OUTPATIENT)
Age: 59
End: 2024-12-09

## 2024-12-13 ENCOUNTER — TRANSCRIPTION ENCOUNTER (OUTPATIENT)
Age: 59
End: 2024-12-13

## 2024-12-17 ENCOUNTER — TRANSCRIPTION ENCOUNTER (OUTPATIENT)
Age: 59
End: 2024-12-17

## 2024-12-18 ENCOUNTER — APPOINTMENT (OUTPATIENT)
Dept: CARDIOLOGY | Facility: CLINIC | Age: 59
End: 2024-12-18

## 2024-12-19 ENCOUNTER — APPOINTMENT (OUTPATIENT)
Dept: CARDIOLOGY | Facility: CLINIC | Age: 59
End: 2024-12-19

## 2025-02-01 ENCOUNTER — RX RENEWAL (OUTPATIENT)
Age: 60
End: 2025-02-01

## 2025-02-10 ENCOUNTER — RX RENEWAL (OUTPATIENT)
Age: 60
End: 2025-02-10

## 2025-04-09 ENCOUNTER — APPOINTMENT (OUTPATIENT)
Dept: ENDOCRINOLOGY | Facility: CLINIC | Age: 60
End: 2025-04-09
Payer: COMMERCIAL

## 2025-04-09 VITALS
WEIGHT: 204 LBS | DIASTOLIC BLOOD PRESSURE: 75 MMHG | BODY MASS INDEX: 29.27 KG/M2 | SYSTOLIC BLOOD PRESSURE: 112 MMHG | HEART RATE: 75 BPM

## 2025-04-09 DIAGNOSIS — E78.5 HYPERLIPIDEMIA, UNSPECIFIED: ICD-10-CM

## 2025-04-09 DIAGNOSIS — N18.31 CHRONIC KIDNEY DISEASE, STAGE 3A: ICD-10-CM

## 2025-04-09 DIAGNOSIS — E11.9 TYPE 2 DIABETES MELLITUS W/OUT COMPLICATIONS: ICD-10-CM

## 2025-04-09 LAB
GLUCOSE BLDC GLUCOMTR-MCNC: 144
HBA1C MFR BLD HPLC: 7.4

## 2025-04-09 PROCEDURE — 83036 HEMOGLOBIN GLYCOSYLATED A1C: CPT | Mod: QW

## 2025-04-09 PROCEDURE — 99215 OFFICE O/P EST HI 40 MIN: CPT

## 2025-04-09 PROCEDURE — 82962 GLUCOSE BLOOD TEST: CPT

## 2025-04-09 RX ORDER — TIRZEPATIDE 2.5 MG/.5ML
2.5 INJECTION, SOLUTION SUBCUTANEOUS
Qty: 6 | Refills: 0 | Status: ACTIVE | OUTPATIENT
Start: 2025-04-09

## 2025-04-12 ENCOUNTER — RX RENEWAL (OUTPATIENT)
Age: 60
End: 2025-04-12

## 2025-04-14 ENCOUNTER — TRANSCRIPTION ENCOUNTER (OUTPATIENT)
Age: 60
End: 2025-04-14

## 2025-04-14 RX ORDER — BLOOD SUGAR DIAGNOSTIC
STRIP MISCELLANEOUS
Qty: 300 | Refills: 0 | Status: ACTIVE | COMMUNITY
Start: 2025-04-14 | End: 1900-01-01

## 2025-05-01 ENCOUNTER — TRANSCRIPTION ENCOUNTER (OUTPATIENT)
Age: 60
End: 2025-05-01

## 2025-05-01 RX ORDER — TIRZEPATIDE 5 MG/.5ML
5 INJECTION, SOLUTION SUBCUTANEOUS
Qty: 6 | Refills: 1 | Status: ACTIVE | COMMUNITY
Start: 2025-05-01 | End: 1900-01-01

## 2025-05-02 ENCOUNTER — TRANSCRIPTION ENCOUNTER (OUTPATIENT)
Age: 60
End: 2025-05-02

## 2025-05-07 ENCOUNTER — NON-APPOINTMENT (OUTPATIENT)
Age: 60
End: 2025-05-07

## 2025-05-09 ENCOUNTER — APPOINTMENT (OUTPATIENT)
Dept: CARDIOLOGY | Facility: CLINIC | Age: 60
End: 2025-05-09

## 2025-05-09 ENCOUNTER — APPOINTMENT (OUTPATIENT)
Dept: CARDIOLOGY | Facility: CLINIC | Age: 60
End: 2025-05-09
Payer: COMMERCIAL

## 2025-05-09 ENCOUNTER — TRANSCRIPTION ENCOUNTER (OUTPATIENT)
Age: 60
End: 2025-05-09

## 2025-05-09 VITALS
OXYGEN SATURATION: 99 % | WEIGHT: 193 LBS | HEART RATE: 85 BPM | DIASTOLIC BLOOD PRESSURE: 70 MMHG | SYSTOLIC BLOOD PRESSURE: 102 MMHG | HEIGHT: 70 IN | BODY MASS INDEX: 27.63 KG/M2

## 2025-05-09 DIAGNOSIS — I25.10 ATHEROSCLEROTIC HEART DISEASE OF NATIVE CORONARY ARTERY W/OUT ANGINA PECTORIS: ICD-10-CM

## 2025-05-09 DIAGNOSIS — I10 ESSENTIAL (PRIMARY) HYPERTENSION: ICD-10-CM

## 2025-05-09 DIAGNOSIS — Z95.1 PRESENCE OF AORTOCORONARY BYPASS GRAFT: ICD-10-CM

## 2025-05-09 DIAGNOSIS — R79.89 OTHER SPECIFIED ABNORMAL FINDINGS OF BLOOD CHEMISTRY: ICD-10-CM

## 2025-05-09 DIAGNOSIS — Z86.39 PERSONAL HISTORY OF OTHER ENDOCRINE, NUTRITIONAL AND METABOLIC DISEASE: ICD-10-CM

## 2025-05-09 DIAGNOSIS — R06.6 HICCOUGH: ICD-10-CM

## 2025-05-09 DIAGNOSIS — I25.5 ISCHEMIC CARDIOMYOPATHY: ICD-10-CM

## 2025-05-09 DIAGNOSIS — E11.9 TYPE 2 DIABETES MELLITUS W/OUT COMPLICATIONS: ICD-10-CM

## 2025-05-09 DIAGNOSIS — N18.31 CHRONIC KIDNEY DISEASE, STAGE 3A: ICD-10-CM

## 2025-05-09 DIAGNOSIS — I49.3 VENTRICULAR PREMATURE DEPOLARIZATION: ICD-10-CM

## 2025-05-09 DIAGNOSIS — H93.19 TINNITUS, UNSPECIFIED EAR: ICD-10-CM

## 2025-05-09 DIAGNOSIS — E78.5 HYPERLIPIDEMIA, UNSPECIFIED: ICD-10-CM

## 2025-05-09 DIAGNOSIS — Z00.00 ENCOUNTER FOR GENERAL ADULT MEDICAL EXAMINATION W/OUT ABNORMAL FINDINGS: ICD-10-CM

## 2025-05-09 PROCEDURE — G2211 COMPLEX E/M VISIT ADD ON: CPT | Mod: NC

## 2025-05-09 PROCEDURE — 99214 OFFICE O/P EST MOD 30 MIN: CPT

## 2025-05-09 RX ORDER — PANTOPRAZOLE 40 MG/1
40 TABLET, DELAYED RELEASE ORAL DAILY
Qty: 30 | Refills: 0 | Status: ACTIVE | COMMUNITY

## 2025-05-19 ENCOUNTER — RX RENEWAL (OUTPATIENT)
Age: 60
End: 2025-05-19

## 2025-05-30 ENCOUNTER — RX RENEWAL (OUTPATIENT)
Age: 60
End: 2025-05-30

## 2025-07-02 ENCOUNTER — APPOINTMENT (OUTPATIENT)
Dept: ENDOCRINOLOGY | Facility: CLINIC | Age: 60
End: 2025-07-02

## 2025-07-02 VITALS
WEIGHT: 182 LBS | BODY MASS INDEX: 26.11 KG/M2 | SYSTOLIC BLOOD PRESSURE: 129 MMHG | DIASTOLIC BLOOD PRESSURE: 88 MMHG | HEART RATE: 85 BPM

## 2025-07-02 PROCEDURE — 83036 HEMOGLOBIN GLYCOSYLATED A1C: CPT | Mod: QW

## 2025-07-02 PROCEDURE — 99214 OFFICE O/P EST MOD 30 MIN: CPT

## 2025-07-02 RX ORDER — EMPAGLIFLOZIN, METFORMIN HYDROCHLORIDE 25; 1000 MG/1; MG/1
25-1000 TABLET, EXTENDED RELEASE ORAL
Qty: 90 | Refills: 3 | Status: ACTIVE | COMMUNITY
Start: 2025-07-02 | End: 1900-01-01

## 2025-07-03 LAB — HBA1C MFR BLD HPLC: 5.7

## 2025-07-04 ENCOUNTER — NON-APPOINTMENT (OUTPATIENT)
Age: 60
End: 2025-07-04

## 2025-07-10 PROBLEM — E66.3 OVERWEIGHT: Status: ACTIVE | Noted: 2025-07-10

## 2025-09-02 ENCOUNTER — RX RENEWAL (OUTPATIENT)
Age: 60
End: 2025-09-02